# Patient Record
Sex: MALE | Race: WHITE | Employment: OTHER | URBAN - NONMETROPOLITAN AREA
[De-identification: names, ages, dates, MRNs, and addresses within clinical notes are randomized per-mention and may not be internally consistent; named-entity substitution may affect disease eponyms.]

---

## 2023-03-07 LAB
ESTIMATED AVERAGE GLUCOSE FOR HBA1C: 169 MG/DL
HEMOGLOBIN A1C/HEMOGLOBIN TOTAL IN BLOOD: 7.5 %

## 2023-03-10 DIAGNOSIS — E87.6 HYPOKALEMIA: ICD-10-CM

## 2023-03-10 RX ORDER — POTASSIUM CHLORIDE 750 MG/1
1 TABLET, EXTENDED RELEASE ORAL DAILY
COMMUNITY
Start: 2021-05-24 | End: 2023-03-10 | Stop reason: SDUPTHER

## 2023-03-10 RX ORDER — POTASSIUM CHLORIDE 750 MG/1
10 TABLET, FILM COATED, EXTENDED RELEASE ORAL DAILY
Qty: 90 TABLET | Refills: 0 | Status: SHIPPED | OUTPATIENT
Start: 2023-03-10 | End: 2023-06-01

## 2023-04-26 ENCOUNTER — TELEMEDICINE (OUTPATIENT)
Dept: PRIMARY CARE | Facility: CLINIC | Age: 88
End: 2023-04-26
Payer: MEDICARE

## 2023-04-26 DIAGNOSIS — J06.9 VIRAL UPPER RESPIRATORY INFECTION: Primary | ICD-10-CM

## 2023-04-26 PROBLEM — E11.9 TYPE 2 DIABETES MELLITUS (MULTI): Status: ACTIVE | Noted: 2023-04-26

## 2023-04-26 PROBLEM — I10 BENIGN ESSENTIAL HYPERTENSION: Status: ACTIVE | Noted: 2023-04-26

## 2023-04-26 PROBLEM — K21.9 ESOPHAGEAL REFLUX: Status: ACTIVE | Noted: 2023-04-26

## 2023-04-26 PROBLEM — E55.9 VITAMIN D DEFICIENCY: Status: ACTIVE | Noted: 2023-04-26

## 2023-04-26 PROBLEM — E87.6 HYPOKALEMIA: Status: ACTIVE | Noted: 2023-04-26

## 2023-04-26 PROBLEM — F32.5 DEPRESSION, MAJOR, IN REMISSION (CMS-HCC): Status: ACTIVE | Noted: 2023-04-26

## 2023-04-26 PROBLEM — E78.5 HYPERLIPIDEMIA: Status: ACTIVE | Noted: 2023-04-26

## 2023-04-26 PROBLEM — I73.9 PVD (PERIPHERAL VASCULAR DISEASE) (CMS-HCC): Status: ACTIVE | Noted: 2023-04-26

## 2023-04-26 PROCEDURE — 99442 PR PHYS/QHP TELEPHONE EVALUATION 11-20 MIN: CPT | Performed by: NURSE PRACTITIONER

## 2023-04-26 RX ORDER — GLYBURIDE-METFORMIN HYDROCHLORIDE 5; 500 MG/1; MG/1
2 TABLET ORAL
COMMUNITY
Start: 2015-02-10 | End: 2023-07-17 | Stop reason: SDUPTHER

## 2023-04-26 RX ORDER — TAMSULOSIN HYDROCHLORIDE 0.4 MG/1
1 CAPSULE ORAL DAILY
COMMUNITY
Start: 2015-02-18

## 2023-04-26 RX ORDER — BLOOD-GLUCOSE METER
1 EACH MISCELLANEOUS 2 TIMES DAILY
COMMUNITY
Start: 2018-03-12 | End: 2023-05-12 | Stop reason: WASHOUT

## 2023-04-26 RX ORDER — ATORVASTATIN CALCIUM 40 MG/1
40 TABLET, FILM COATED ORAL DAILY
COMMUNITY
Start: 2015-02-10 | End: 2023-06-02 | Stop reason: SDUPTHER

## 2023-04-26 RX ORDER — ATENOLOL AND CHLORTHALIDONE TABLET 50; 25 MG/1; MG/1
1 TABLET ORAL DAILY
COMMUNITY
Start: 2015-02-10 | End: 2023-06-02 | Stop reason: SDUPTHER

## 2023-04-26 RX ORDER — ALOGLIPTIN 25 MG/1
1 TABLET, FILM COATED ORAL DAILY
COMMUNITY
End: 2023-06-20

## 2023-04-26 RX ORDER — SPIRONOLACTONE 25 MG
1 TABLET ORAL DAILY
COMMUNITY

## 2023-04-26 RX ORDER — LINAGLIPTIN 5 MG/1
1 TABLET, FILM COATED ORAL DAILY
COMMUNITY
Start: 2022-08-23 | End: 2023-07-17 | Stop reason: SDUPTHER

## 2023-04-26 RX ORDER — LEUPROLIDE ACETATE 45 MG
45 KIT INTRAMUSCULAR
COMMUNITY
Start: 2019-03-11

## 2023-04-26 RX ORDER — ACETAMINOPHEN 500 MG
1 TABLET ORAL DAILY
COMMUNITY
Start: 2018-06-11

## 2023-04-26 RX ORDER — ASPIRIN 81 MG/1
1 TABLET ORAL DAILY
COMMUNITY
Start: 2015-11-19

## 2023-04-26 ASSESSMENT — ENCOUNTER SYMPTOMS
HEMATOLOGIC/LYMPHATIC NEGATIVE: 1
COUGH: 1
WHEEZING: 0
DIARRHEA: 0
EYES NEGATIVE: 1
ACTIVITY CHANGE: 0
BACK PAIN: 1
NAUSEA: 0
SORE THROAT: 0
ENDOCRINE NEGATIVE: 1
SHORTNESS OF BREATH: 0
CONSTIPATION: 0
CHILLS: 0
UNEXPECTED WEIGHT CHANGE: 0
RHINORRHEA: 1
PSYCHIATRIC NEGATIVE: 1
NUMBNESS: 0
FATIGUE: 0
ABDOMINAL PAIN: 0
PALPITATIONS: 0
DIZZINESS: 0
VOMITING: 0
HEADACHES: 0
ALLERGIC/IMMUNOLOGIC NEGATIVE: 1
ARTHRALGIAS: 1
GASTROINTESTINAL NEGATIVE: 1
SPEECH DIFFICULTY: 0

## 2023-04-26 NOTE — PROGRESS NOTES
Subjective   Patient ID: Dmitriy Delgado is a 89 y.o. male who presents for Cough and Nasal Congestion.    Virtual or Telephone Consent    A telephone visit (audio only) between the patient (at the originating site) and the provider (at the distant site) was utilized to provide this telehealth service.   Verbal consent was requested and obtained from Dmitriy Delgado on this date, 04/26/23 for a telehealth visit.      Cough  This is a new problem. The current episode started in the past 7 days. The cough is Non-productive. Associated symptoms include rhinorrhea. Pertinent negatives include no chest pain, chills, ear pain, headaches, sore throat, shortness of breath or wheezing. Nothing aggravates the symptoms. He has tried nothing for the symptoms.   Recommend start over-the-counter histamine such as Claritin or Zyrtec. Over-the-counter cough suppressant such as Robitussin or Mucinex.  Instructed to call the office if symptoms worsen or do not improve.    Review of Systems   Constitutional:  Negative for activity change, chills, fatigue and unexpected weight change.   HENT:  Positive for rhinorrhea. Negative for congestion, ear pain and sore throat.    Eyes: Negative.    Respiratory:  Positive for cough. Negative for shortness of breath and wheezing.    Cardiovascular:  Negative for chest pain, palpitations and leg swelling.   Gastrointestinal: Negative.  Negative for abdominal pain, constipation, diarrhea, nausea and vomiting.   Endocrine: Negative.    Genitourinary: Negative.    Musculoskeletal:  Positive for arthralgias and back pain.   Skin: Negative.    Allergic/Immunologic: Negative.    Neurological:  Negative for dizziness, speech difficulty, numbness and headaches.   Hematological: Negative.    Psychiatric/Behavioral: Negative.     All other systems reviewed and are negative.      Objective   There were no vitals taken for this visit.    Physical Exam Unable, telephone visit    Assessment/Plan   Problem  List Items Addressed This Visit    None  Visit Diagnoses       Viral upper respiratory infection    -  Primary  -Over-the-counter Mucinex or Robitussin according to package directions as needed for cough  -OTC Claritin or Zyrtec  -Tylenol 650-1000 mg every 8 hours as needed for pain  -Drink plenty of fluids  -Get plenty of rest  -Call the office if symptoms worsen or do not improve

## 2023-05-01 ENCOUNTER — PATIENT OUTREACH (OUTPATIENT)
Dept: PRIMARY CARE | Facility: CLINIC | Age: 88
End: 2023-05-01
Payer: MEDICARE

## 2023-05-01 DIAGNOSIS — J18.9 COMMUNITY ACQUIRED PNEUMONIA, UNSPECIFIED LATERALITY: ICD-10-CM

## 2023-05-01 RX ORDER — CEFUROXIME AXETIL 500 MG/1
500 TABLET ORAL 2 TIMES DAILY
COMMUNITY
End: 2023-06-20 | Stop reason: ALTCHOICE

## 2023-05-01 RX ORDER — DEXAMETHASONE 2 MG/1
TABLET ORAL
COMMUNITY
End: 2023-06-20 | Stop reason: ALTCHOICE

## 2023-05-01 NOTE — PROGRESS NOTES
Engagement  Call Start Time: 1128 (5/1/2023 11:35 AM)    Medications  Medications reviewed with patient/caregiver?: Yes (5/1/2023 11:35 AM)  Is the patient having any side effects they believe may be caused by any medication additions or changes?: No (5/1/2023 11:35 AM)  Does the patient have all medications ordered at discharge?: Yes (5/1/2023 11:35 AM)  Prescription Comments: see med list (5/1/2023 11:35 AM)  Is the patient taking all medications as directed (includes completed medication regime)?: Yes (5/1/2023 11:35 AM)  Medication Comments: see med list (5/1/2023 11:35 AM)    Appointments  Does the patient have a primary care provider?: Yes (5/1/2023 11:35 AM)  Care Management Interventions: Verified appointment date/time/provider; Advised patient to make appointment; Educated patient on importance of making appointment (5/1/2023 11:35 AM)  Has the patient kept scheduled appointments due by today?: Yes (5/1/2023 11:35 AM)    Patient Teaching  Does the patient have access to their discharge instructions?: Yes (5/1/2023 11:35 AM)  Care Management Interventions: Reviewed instructions with patient (5/1/2023 11:35 AM)  What is the patient's perception of their health status since discharge?: Same (Patient reported feeling fatigue and weak.) (5/1/2023 11:35 AM)      Discharge Facility:[facility]  Discharge Diagnosis:Community Acquired Pneumonia   Admission Date:4/27/23  Discharge Date: 4/29/23    PCP Appointment Date:5/12/23  Specialist Appointment Date:   Hospital Encounter and Summary: Linked   See discharge assessment below for further details

## 2023-05-11 PROBLEM — C61 MALIGNANT TUMOR OF PROSTATE (MULTI): Status: ACTIVE | Noted: 2023-05-11

## 2023-05-11 PROBLEM — E29.1 TESTICULAR HYPOFUNCTION: Status: ACTIVE | Noted: 2023-05-11

## 2023-05-12 ENCOUNTER — OFFICE VISIT (OUTPATIENT)
Dept: PRIMARY CARE | Facility: CLINIC | Age: 88
End: 2023-05-12
Payer: MEDICARE

## 2023-05-12 VITALS
TEMPERATURE: 96.7 F | SYSTOLIC BLOOD PRESSURE: 100 MMHG | DIASTOLIC BLOOD PRESSURE: 62 MMHG | WEIGHT: 214 LBS | OXYGEN SATURATION: 99 % | HEART RATE: 61 BPM | BODY MASS INDEX: 35.61 KG/M2

## 2023-05-12 DIAGNOSIS — J18.9 COMMUNITY ACQUIRED PNEUMONIA, UNSPECIFIED LATERALITY: ICD-10-CM

## 2023-05-12 DIAGNOSIS — E66.01 CLASS 2 SEVERE OBESITY WITH SERIOUS COMORBIDITY AND BODY MASS INDEX (BMI) OF 35.0 TO 35.9 IN ADULT, UNSPECIFIED OBESITY TYPE (MULTI): ICD-10-CM

## 2023-05-12 DIAGNOSIS — I10 BENIGN ESSENTIAL HYPERTENSION: ICD-10-CM

## 2023-05-12 DIAGNOSIS — L01.00 IMPETIGO: ICD-10-CM

## 2023-05-12 DIAGNOSIS — E11.65 TYPE 2 DIABETES MELLITUS WITH HYPERGLYCEMIA, WITHOUT LONG-TERM CURRENT USE OF INSULIN (MULTI): ICD-10-CM

## 2023-05-12 DIAGNOSIS — E11.65 TYPE 2 DIABETES MELLITUS WITH HYPERGLYCEMIA, WITHOUT LONG-TERM CURRENT USE OF INSULIN (MULTI): Primary | ICD-10-CM

## 2023-05-12 PROBLEM — E66.812 CLASS 2 SEVERE OBESITY WITH SERIOUS COMORBIDITY AND BODY MASS INDEX (BMI) OF 35.0 TO 35.9 IN ADULT: Status: ACTIVE | Noted: 2023-05-12

## 2023-05-12 PROCEDURE — 3078F DIAST BP <80 MM HG: CPT | Performed by: NURSE PRACTITIONER

## 2023-05-12 PROCEDURE — 3074F SYST BP LT 130 MM HG: CPT | Performed by: NURSE PRACTITIONER

## 2023-05-12 PROCEDURE — 1036F TOBACCO NON-USER: CPT | Performed by: NURSE PRACTITIONER

## 2023-05-12 PROCEDURE — 1160F RVW MEDS BY RX/DR IN RCRD: CPT | Performed by: NURSE PRACTITIONER

## 2023-05-12 PROCEDURE — 99495 TRANSJ CARE MGMT MOD F2F 14D: CPT | Performed by: NURSE PRACTITIONER

## 2023-05-12 PROCEDURE — 1159F MED LIST DOCD IN RCRD: CPT | Performed by: NURSE PRACTITIONER

## 2023-05-12 RX ORDER — CALCIUM CITRATE/VITAMIN D3 200MG-6.25
TABLET ORAL
Qty: 100 STRIP | Refills: 0 | Status: SHIPPED | OUTPATIENT
Start: 2023-05-12 | End: 2024-03-07 | Stop reason: WASHOUT

## 2023-05-12 RX ORDER — LANCETS 33 GAUGE
EACH MISCELLANEOUS
Qty: 100 EACH | Refills: 0 | Status: SHIPPED | OUTPATIENT
Start: 2023-05-12 | End: 2024-03-07 | Stop reason: WASHOUT

## 2023-05-12 RX ORDER — MUPIROCIN 20 MG/G
OINTMENT TOPICAL 3 TIMES DAILY
Qty: 22 G | Refills: 0 | Status: SHIPPED | OUTPATIENT
Start: 2023-05-12 | End: 2023-05-22

## 2023-05-12 RX ORDER — INSULIN PUMP SYRINGE, 3 ML
1 EACH MISCELLANEOUS
Qty: 1 EACH | Refills: 0 | Status: SHIPPED | OUTPATIENT
Start: 2023-05-12 | End: 2023-05-16

## 2023-05-12 ASSESSMENT — ENCOUNTER SYMPTOMS
WHEEZING: 0
CONSTIPATION: 0
PALPITATIONS: 0
DIARRHEA: 0
VOMITING: 0
UNEXPECTED WEIGHT CHANGE: 0
SHORTNESS OF BREATH: 0
SPEECH DIFFICULTY: 0
ENDOCRINE NEGATIVE: 1
HEMATOLOGIC/LYMPHATIC NEGATIVE: 1
CHILLS: 0
ALLERGIC/IMMUNOLOGIC NEGATIVE: 1
GASTROINTESTINAL NEGATIVE: 1
HEADACHES: 0
EYES NEGATIVE: 1
DIZZINESS: 0
BACK PAIN: 1
ACTIVITY CHANGE: 0
COUGH: 1
NUMBNESS: 0
SORE THROAT: 0
ABDOMINAL PAIN: 0
NAUSEA: 0
PSYCHIATRIC NEGATIVE: 1
ARTHRALGIAS: 1
RHINORRHEA: 1
FATIGUE: 0

## 2023-05-12 NOTE — ASSESSMENT & PLAN NOTE
-Avoid sweets and sugary drinks  -Drink plenty of water  -Avoid processed foods and refined foods  -Eat fruits, vegetables, lean protein, whole grains  -Increase your activity level

## 2023-05-12 NOTE — PROGRESS NOTES
"Patient: Dmitriy Delgado  : 1933  PCP: Amanda Garcia, APRN-CNP, [facility]  MRN: [id number]  Program: No linked episodes       Dmitriy Delgado is a 89 y.o. male presenting today for follow-up after being discharged from the hospital 13 days ago. The main problem requiring admission was CAP. The discharge summary and/or Transitional Care Management documentation was reviewed. Medication reconciliation was performed as indicated via the \"Malik as Reviewed\" timestamp.     Dmitriy Delgado was contacted by Transitional Care Management services two days after his discharge. This encounter and supporting documentation was reviewed.    The complexity of medical decision making for this patient's transitional care is moderate.    CAP, finished with antibiotics and steroids. He is feeling down after being sick. No cough, no dyspnea. Still feeling tired. Eating well.  Patient has crusted rash under nostrils from nasal cannula during hospitalization. Has been improving. Start mupirocin 3 times a day for 10 days.    Physical Exam  Vitals and nursing note reviewed.   Constitutional:       General: He is not in acute distress.     Appearance: Normal appearance. He is obese. He is not ill-appearing.   HENT:      Head: Normocephalic and atraumatic.      Right Ear: Tympanic membrane, ear canal and external ear normal.      Left Ear: Tympanic membrane, ear canal and external ear normal.      Nose: Nose normal.      Mouth/Throat:      Mouth: Mucous membranes are moist.      Pharynx: Oropharynx is clear.   Eyes:      Extraocular Movements: Extraocular movements intact.      Conjunctiva/sclera: Conjunctivae normal.      Pupils: Pupils are equal, round, and reactive to light.   Cardiovascular:      Rate and Rhythm: Normal rate and regular rhythm.      Pulses: Normal pulses.      Heart sounds: Normal heart sounds. No murmur heard.  Pulmonary:      Effort: Pulmonary effort is normal. No respiratory distress.      Breath sounds: " Normal breath sounds. No wheezing.   Abdominal:      General: Bowel sounds are normal. There is no distension.      Palpations: Abdomen is soft.      Tenderness: There is no abdominal tenderness.   Musculoskeletal:         General: No tenderness. Normal range of motion.      Cervical back: Normal range of motion and neck supple.      Right lower leg: No edema.      Left lower leg: No edema.      Comments: Using cane   Skin:     General: Skin is warm and dry.      Capillary Refill: Capillary refill takes less than 2 seconds.   Neurological:      General: No focal deficit present.      Mental Status: He is alert and oriented to person, place, and time.   Psychiatric:         Mood and Affect: Mood normal.         Behavior: Behavior normal.         Thought Content: Thought content normal.         Judgment: Judgment normal.         Assessment/Plan   Problem List Items Addressed This Visit          Circulatory    Benign essential hypertension     Controlled. Continue current medications.  -Low fat/cholesterol, low sodium, low carbohydrate diet  -Exercise as tolerated 150 minutes/week, increase activity slowly  -Weight loss            Endocrine/Metabolic    Type 2 diabetes mellitus (CMS/HCC) - Primary     -Low flat/cholesterol, low sodium, carbohydrate controlled diet  -Exercise as tolerated 150 minutes/week, gradually increase activity  -Weight loss  -Regular follow-up with ophthalmology and podiatry         Relevant Medications    FreeStyle glucose monitoring (True Metrix Air Glucose Meter) kit    blood sugar diagnostic (True Metrix Glucose Test Strip) strip    lancets 33 gauge misc    Other Relevant Orders    Follow Up In Primary Care    Class 2 severe obesity with serious comorbidity and body mass index (BMI) of 35.0 to 35.9 in adult (CMS/HCC)     -Avoid sweets and sugary drinks  -Drink plenty of water  -Avoid processed foods and refined foods  -Eat fruits, vegetables, lean protein, whole grains  -Increase your  activity level            Other Visit Diagnoses       Impetigo        Relevant Medications    mupirocin (Bactroban) 2 % ointment    Community acquired pneumonia, unspecified laterality      Call the office if symptoms worsen or do not improve  Stay well hydrated            Review of Systems   Constitutional:  Negative for activity change, chills, fatigue and unexpected weight change.   HENT:  Positive for hearing loss and rhinorrhea. Negative for congestion, ear pain and sore throat.    Eyes: Negative.    Respiratory:  Positive for cough. Negative for shortness of breath and wheezing.    Cardiovascular:  Negative for chest pain, palpitations and leg swelling.   Gastrointestinal: Negative.  Negative for abdominal pain, constipation, diarrhea, nausea and vomiting.   Endocrine: Negative.    Genitourinary: Negative.    Musculoskeletal:  Positive for arthralgias, back pain and gait problem.   Skin: Negative.    Allergic/Immunologic: Negative.    Neurological:  Negative for dizziness, speech difficulty, numbness and headaches.   Hematological: Negative.    Psychiatric/Behavioral: Negative.     All other systems reviewed and are negative.      No family history on file.    Engagement  Call Start Time: 1128 (5/1/2023 11:35 AM)    Medications  Medications reviewed with patient/caregiver?: Yes (5/1/2023 11:35 AM)  Is the patient having any side effects they believe may be caused by any medication additions or changes?: No (5/1/2023 11:35 AM)  Does the patient have all medications ordered at discharge?: Yes (5/1/2023 11:35 AM)  Prescription Comments: see med list (5/1/2023 11:35 AM)  Is the patient taking all medications as directed (includes completed medication regime)?: Yes (5/1/2023 11:35 AM)  Medication Comments: see med list (5/1/2023 11:35 AM)    Appointments  Does the patient have a primary care provider?: Yes (5/1/2023 11:35 AM)  Care Management Interventions: Verified appointment date/time/provider; Advised patient  to make appointment; Educated patient on importance of making appointment (5/1/2023 11:35 AM)  Has the patient kept scheduled appointments due by today?: Yes (5/1/2023 11:35 AM)    Patient Teaching  Does the patient have access to their discharge instructions?: Yes (5/1/2023 11:35 AM)  Care Management Interventions: Reviewed instructions with patient (5/1/2023 11:35 AM)  What is the patient's perception of their health status since discharge?: Same (Patient reported feeling fatigue and weak.) (5/1/2023 11:35 AM)        No follow-ups on file.

## 2023-05-12 NOTE — ASSESSMENT & PLAN NOTE
-Low flat/cholesterol, low sodium, carbohydrate controlled diet  -Exercise as tolerated 150 minutes/week, gradually increase activity  -Weight loss  -Regular follow-up with ophthalmology and podiatry

## 2023-05-16 RX ORDER — INSULIN PUMP SYRINGE, 3 ML
EACH MISCELLANEOUS
Qty: 1 EACH | Refills: 0 | Status: SHIPPED | OUTPATIENT
Start: 2023-05-16 | End: 2024-03-07 | Stop reason: WASHOUT

## 2023-05-17 DIAGNOSIS — E11.9 TYPE 2 DIABETES MELLITUS WITHOUT COMPLICATION, UNSPECIFIED WHETHER LONG TERM INSULIN USE (MULTI): ICD-10-CM

## 2023-05-18 RX ORDER — BLOOD-GLUCOSE METER
EACH MISCELLANEOUS
Qty: 90 STRIP | Refills: 0 | Status: SHIPPED | OUTPATIENT
Start: 2023-05-18 | End: 2024-03-07 | Stop reason: WASHOUT

## 2023-05-23 ENCOUNTER — APPOINTMENT (OUTPATIENT)
Dept: PRIMARY CARE | Facility: CLINIC | Age: 88
End: 2023-05-23
Payer: MEDICARE

## 2023-06-01 DIAGNOSIS — E87.6 HYPOKALEMIA: ICD-10-CM

## 2023-06-01 RX ORDER — POTASSIUM CHLORIDE 750 MG/1
TABLET, EXTENDED RELEASE ORAL
Qty: 90 TABLET | Refills: 0 | Status: SHIPPED | OUTPATIENT
Start: 2023-06-01 | End: 2023-08-23

## 2023-06-02 DIAGNOSIS — E78.5 HYPERLIPIDEMIA, UNSPECIFIED HYPERLIPIDEMIA TYPE: ICD-10-CM

## 2023-06-02 DIAGNOSIS — I10 BENIGN ESSENTIAL HYPERTENSION: ICD-10-CM

## 2023-06-02 RX ORDER — ATENOLOL AND CHLORTHALIDONE TABLET 50; 25 MG/1; MG/1
1 TABLET ORAL DAILY
Qty: 90 TABLET | Refills: 0 | Status: SHIPPED | OUTPATIENT
Start: 2023-06-02 | End: 2023-10-03 | Stop reason: SDUPTHER

## 2023-06-02 RX ORDER — ATORVASTATIN CALCIUM 40 MG/1
40 TABLET, FILM COATED ORAL DAILY
Qty: 90 TABLET | Refills: 0 | Status: SHIPPED | OUTPATIENT
Start: 2023-06-02 | End: 2023-10-03 | Stop reason: SDUPTHER

## 2023-06-13 ENCOUNTER — APPOINTMENT (OUTPATIENT)
Dept: PRIMARY CARE | Facility: CLINIC | Age: 88
End: 2023-06-13
Payer: MEDICARE

## 2023-06-20 ENCOUNTER — OFFICE VISIT (OUTPATIENT)
Dept: PRIMARY CARE | Facility: CLINIC | Age: 88
End: 2023-06-20
Payer: MEDICARE

## 2023-06-20 VITALS
DIASTOLIC BLOOD PRESSURE: 62 MMHG | WEIGHT: 216.7 LBS | SYSTOLIC BLOOD PRESSURE: 138 MMHG | BODY MASS INDEX: 36.06 KG/M2 | HEART RATE: 50 BPM | TEMPERATURE: 96.7 F | OXYGEN SATURATION: 96 %

## 2023-06-20 DIAGNOSIS — E11.65 TYPE 2 DIABETES MELLITUS WITH HYPERGLYCEMIA, WITHOUT LONG-TERM CURRENT USE OF INSULIN (MULTI): Primary | ICD-10-CM

## 2023-06-20 DIAGNOSIS — F32.5 DEPRESSION, MAJOR, IN REMISSION (CMS-HCC): ICD-10-CM

## 2023-06-20 DIAGNOSIS — I10 BENIGN ESSENTIAL HYPERTENSION: ICD-10-CM

## 2023-06-20 DIAGNOSIS — E55.9 VITAMIN D DEFICIENCY: ICD-10-CM

## 2023-06-20 DIAGNOSIS — E66.01 CLASS 2 SEVERE OBESITY WITH SERIOUS COMORBIDITY AND BODY MASS INDEX (BMI) OF 36.0 TO 36.9 IN ADULT, UNSPECIFIED OBESITY TYPE (MULTI): ICD-10-CM

## 2023-06-20 DIAGNOSIS — E78.2 MIXED HYPERLIPIDEMIA: ICD-10-CM

## 2023-06-20 DIAGNOSIS — C61 MALIGNANT TUMOR OF PROSTATE (MULTI): ICD-10-CM

## 2023-06-20 DIAGNOSIS — I73.9 PVD (PERIPHERAL VASCULAR DISEASE) (CMS-HCC): ICD-10-CM

## 2023-06-20 PROBLEM — E66.812 CLASS 2 SEVERE OBESITY WITH SERIOUS COMORBIDITY AND BODY MASS INDEX (BMI) OF 35.0 TO 35.9 IN ADULT: Status: RESOLVED | Noted: 2023-05-12 | Resolved: 2023-06-20

## 2023-06-20 PROCEDURE — 99214 OFFICE O/P EST MOD 30 MIN: CPT | Performed by: NURSE PRACTITIONER

## 2023-06-20 PROCEDURE — 3075F SYST BP GE 130 - 139MM HG: CPT | Performed by: NURSE PRACTITIONER

## 2023-06-20 PROCEDURE — 3078F DIAST BP <80 MM HG: CPT | Performed by: NURSE PRACTITIONER

## 2023-06-20 PROCEDURE — 1160F RVW MEDS BY RX/DR IN RCRD: CPT | Performed by: NURSE PRACTITIONER

## 2023-06-20 PROCEDURE — 1159F MED LIST DOCD IN RCRD: CPT | Performed by: NURSE PRACTITIONER

## 2023-06-20 PROCEDURE — 1036F TOBACCO NON-USER: CPT | Performed by: NURSE PRACTITIONER

## 2023-06-20 ASSESSMENT — ENCOUNTER SYMPTOMS
ABDOMINAL PAIN: 0
HEMATOLOGIC/LYMPHATIC NEGATIVE: 1
NAUSEA: 0
SPEECH DIFFICULTY: 0
CONSTIPATION: 0
WHEEZING: 0
BACK PAIN: 1
PALPITATIONS: 0
DIZZINESS: 0
ARTHRALGIAS: 1
ENDOCRINE NEGATIVE: 1
RHINORRHEA: 0
SHORTNESS OF BREATH: 0
VOMITING: 0
ACTIVITY CHANGE: 0
NUMBNESS: 0
UNEXPECTED WEIGHT CHANGE: 0
ALLERGIC/IMMUNOLOGIC NEGATIVE: 1
DIARRHEA: 0
FATIGUE: 0
COUGH: 0
EYES NEGATIVE: 1
PSYCHIATRIC NEGATIVE: 1
GASTROINTESTINAL NEGATIVE: 1
SORE THROAT: 0
HEADACHES: 0
CHILLS: 0

## 2023-06-20 NOTE — PROGRESS NOTES
Subjective   Patient ID: Dmitriy Delgado is a 89 y.o. male who presents for Hypertension, Hyperlipidemia, and Diabetes.    Hospitalized in May for CAP. Impetigo cleared up. Denies cough, dyspnea.  Complains of multiple joint aches and pains, worse in his knees. Rarely takes Tylenol. Recommend Tylenol 650 mg 2 tabs every 12 hours as needed for pain. He is not interested in physical therapy. Seeing ortho for injections. Helps for a while.  GERD controlled with omeprazole 20 mg daily.  A1c 7.5%, taking glyburide-metformin  mg and Tradjenta 5 mg daily Wants to work on diet and weight loss. Discussed lifestyle changes including portion control, food choices, and increased activity.  PVD, neuropathy. Complains of some tingling in his feet. Denies cramping. Needs to return to podiatry for regular diabetic foot care.  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   HENT:  Positive for hearing loss. Negative for congestion, ear pain, rhinorrhea and sore throat.    Eyes: Negative.    Respiratory:  Negative for cough, shortness of breath and wheezing.    Cardiovascular:  Negative for chest pain, palpitations and leg swelling.   Gastrointestinal: Negative.  Negative for abdominal pain, constipation, diarrhea, nausea and vomiting.   Endocrine: Negative.    Genitourinary: Negative.    Musculoskeletal:  Positive for arthralgias, back pain and gait problem.   Skin: Negative.    Allergic/Immunologic: Negative.    Neurological:  Negative for dizziness, speech difficulty, numbness and headaches.   Hematological: Negative.    Psychiatric/Behavioral: Negative.     All other systems reviewed and are negative.      Objective   /62   Pulse 50   Temp 35.9 °C (96.7 °F)   Wt 98.3 kg (216 lb 11.2 oz)   SpO2 96%   BMI  36.06 kg/m²     Physical Exam  Vitals and nursing note reviewed.   Constitutional:       General: He is not in acute distress.     Appearance: Normal appearance. He is obese. He is not ill-appearing.   HENT:      Head: Normocephalic and atraumatic.      Right Ear: Tympanic membrane, ear canal and external ear normal.      Left Ear: Tympanic membrane, ear canal and external ear normal.      Nose: Nose normal.      Mouth/Throat:      Mouth: Mucous membranes are moist.      Pharynx: Oropharynx is clear.   Eyes:      Extraocular Movements: Extraocular movements intact.      Conjunctiva/sclera: Conjunctivae normal.      Pupils: Pupils are equal, round, and reactive to light.   Cardiovascular:      Rate and Rhythm: Normal rate and regular rhythm.      Pulses: Normal pulses.      Heart sounds: Normal heart sounds. No murmur heard.  Pulmonary:      Effort: Pulmonary effort is normal. No respiratory distress.      Breath sounds: Normal breath sounds. No wheezing.   Abdominal:      General: Bowel sounds are normal. There is no distension.      Palpations: Abdomen is soft.      Tenderness: There is no abdominal tenderness.   Musculoskeletal:         General: No tenderness. Normal range of motion.      Cervical back: Normal range of motion and neck supple.      Right lower leg: No edema.      Left lower leg: No edema.      Comments: Using cane   Skin:     General: Skin is warm and dry.      Capillary Refill: Capillary refill takes less than 2 seconds.   Neurological:      General: No focal deficit present.      Mental Status: He is alert and oriented to person, place, and time.   Psychiatric:         Mood and Affect: Mood normal.         Behavior: Behavior normal.         Thought Content: Thought content normal.         Judgment: Judgment normal.       Assessment/Plan     # Hypertension, controlled  -continue atenolol-chlorthalidone  -Low fat/cholesterol, low sodium, low carbohydrate diet  -Exercise as tolerated 150  minutes/week, increase activity slowly  -Weight loss   # Diabetes, A1c 7.5%  -glyburide/metformin 10 mg/1000 mg 2 times daily, be sure to take twice a day  -continue Tradjenta 5 mg daily  -better glycemic control, decrease carbohydrate intake, increase activity level gradually  -Low fat/cholesterol, low sodium, carbohydrate controlled diet  -Exercise as tolerated 150 minutes/week, increase activity slowly  -Weight loss  # Hyperlipidemia  -continue atorvastatin 40 mg daily  -low fat/low cholesterol diet  -eat more fresh foods  -avoid processed/pre-packaged/fast foods  -increase physical activity  -monitor lipid panel  -weight loss  # Joint pain  -Tylenol 650 mg every 6 hours as needed for pain or ibuprofen 200 mg take one tab 2 times a day as needed for pain, gentle stretching exercises, referral to PT  -follow with ortho  # Diabetic foot care and onychomycosis  -follow with podiatry  # Vitamin D deficiency  -continue vitamin D 125 mcg daily  -monitor vitamin D  # Hx prostate cancer  -follow with urology  # PVD  -monitor  # Squamous cell carcinoma  -following with surgery  # Obesity, BMI 36  -Avoid sweets and sugary drinks  -Drink plenty of water  -Avoid processed foods and refined foods  -Eat fruits, vegetables, lean protein, whole grains  -Increase your activity level      Follow-up in 3 months and as needed

## 2023-07-17 DIAGNOSIS — E11.9 TYPE 2 DIABETES MELLITUS WITHOUT COMPLICATION, UNSPECIFIED WHETHER LONG TERM INSULIN USE (MULTI): ICD-10-CM

## 2023-07-17 RX ORDER — LINAGLIPTIN 5 MG/1
5 TABLET, FILM COATED ORAL DAILY
Qty: 90 TABLET | Refills: 0 | Status: SHIPPED | OUTPATIENT
Start: 2023-07-17 | End: 2024-03-25

## 2023-07-17 RX ORDER — GLYBURIDE-METFORMIN HYDROCHLORIDE 5; 500 MG/1; MG/1
2 TABLET ORAL
Qty: 360 TABLET | Refills: 0 | Status: SHIPPED | OUTPATIENT
Start: 2023-07-17 | End: 2024-03-25

## 2023-08-23 DIAGNOSIS — E87.6 HYPOKALEMIA: ICD-10-CM

## 2023-08-23 RX ORDER — POTASSIUM CHLORIDE 750 MG/1
TABLET, EXTENDED RELEASE ORAL
Qty: 90 TABLET | Refills: 0 | Status: SHIPPED | OUTPATIENT
Start: 2023-08-23 | End: 2023-11-28

## 2023-09-13 ENCOUNTER — LAB (OUTPATIENT)
Dept: LAB | Facility: LAB | Age: 88
End: 2023-09-13
Payer: MEDICARE

## 2023-09-13 DIAGNOSIS — E11.65 TYPE 2 DIABETES MELLITUS WITH HYPERGLYCEMIA, WITHOUT LONG-TERM CURRENT USE OF INSULIN (MULTI): ICD-10-CM

## 2023-09-13 DIAGNOSIS — E78.2 MIXED HYPERLIPIDEMIA: ICD-10-CM

## 2023-09-13 DIAGNOSIS — E55.9 VITAMIN D DEFICIENCY: ICD-10-CM

## 2023-09-13 LAB
ALANINE AMINOTRANSFERASE (SGPT) (U/L) IN SER/PLAS: 19 U/L (ref 10–52)
ALBUMIN (G/DL) IN SER/PLAS: 4 G/DL (ref 3.4–5)
ALKALINE PHOSPHATASE (U/L) IN SER/PLAS: 64 U/L (ref 33–136)
ANION GAP IN SER/PLAS: 15 MMOL/L (ref 10–20)
ASPARTATE AMINOTRANSFERASE (SGOT) (U/L) IN SER/PLAS: 20 U/L (ref 9–39)
BASOPHILS (10*3/UL) IN BLOOD BY AUTOMATED COUNT: 0.04 X10E9/L (ref 0–0.1)
BASOPHILS/100 LEUKOCYTES IN BLOOD BY AUTOMATED COUNT: 0.5 % (ref 0–2)
BILIRUBIN TOTAL (MG/DL) IN SER/PLAS: 0.8 MG/DL (ref 0–1.2)
CALCIUM (MG/DL) IN SER/PLAS: 9.8 MG/DL (ref 8.6–10.3)
CARBON DIOXIDE, TOTAL (MMOL/L) IN SER/PLAS: 29 MMOL/L (ref 21–32)
CHLORIDE (MMOL/L) IN SER/PLAS: 97 MMOL/L (ref 98–107)
CHOLESTEROL (MG/DL) IN SER/PLAS: 137 MG/DL (ref 0–199)
CHOLESTEROL IN HDL (MG/DL) IN SER/PLAS: 33.7 MG/DL
CHOLESTEROL/HDL RATIO: 4.1
CREATININE (MG/DL) IN SER/PLAS: 1.14 MG/DL (ref 0.5–1.3)
EOSINOPHILS (10*3/UL) IN BLOOD BY AUTOMATED COUNT: 0.27 X10E9/L (ref 0–0.4)
EOSINOPHILS/100 LEUKOCYTES IN BLOOD BY AUTOMATED COUNT: 3.2 % (ref 0–6)
ERYTHROCYTE DISTRIBUTION WIDTH (RATIO) BY AUTOMATED COUNT: 12.7 % (ref 11.5–14.5)
ERYTHROCYTE MEAN CORPUSCULAR HEMOGLOBIN CONCENTRATION (G/DL) BY AUTOMATED: 32.5 G/DL (ref 32–36)
ERYTHROCYTE MEAN CORPUSCULAR VOLUME (FL) BY AUTOMATED COUNT: 97 FL (ref 80–100)
ERYTHROCYTES (10*6/UL) IN BLOOD BY AUTOMATED COUNT: 3.67 X10E12/L (ref 4.5–5.9)
GFR MALE: 61 ML/MIN/1.73M2
GLUCOSE (MG/DL) IN SER/PLAS: 233 MG/DL (ref 74–99)
HEMATOCRIT (%) IN BLOOD BY AUTOMATED COUNT: 35.7 % (ref 41–52)
HEMOGLOBIN (G/DL) IN BLOOD: 11.6 G/DL (ref 13.5–17.5)
IMMATURE GRANULOCYTES/100 LEUKOCYTES IN BLOOD BY AUTOMATED COUNT: 0.5 % (ref 0–0.9)
LDL: 34 MG/DL (ref 0–99)
LEUKOCYTES (10*3/UL) IN BLOOD BY AUTOMATED COUNT: 8.3 X10E9/L (ref 4.4–11.3)
LYMPHOCYTES (10*3/UL) IN BLOOD BY AUTOMATED COUNT: 1.95 X10E9/L (ref 0.8–3)
LYMPHOCYTES/100 LEUKOCYTES IN BLOOD BY AUTOMATED COUNT: 23.4 % (ref 13–44)
MONOCYTES (10*3/UL) IN BLOOD BY AUTOMATED COUNT: 0.79 X10E9/L (ref 0.05–0.8)
MONOCYTES/100 LEUKOCYTES IN BLOOD BY AUTOMATED COUNT: 9.5 % (ref 2–10)
NEUTROPHILS (10*3/UL) IN BLOOD BY AUTOMATED COUNT: 5.23 X10E9/L (ref 1.6–5.5)
NEUTROPHILS/100 LEUKOCYTES IN BLOOD BY AUTOMATED COUNT: 62.9 % (ref 40–80)
NON HDL CHOLESTEROL: 103 MG/DL
PLATELETS (10*3/UL) IN BLOOD AUTOMATED COUNT: 198 X10E9/L (ref 150–450)
POTASSIUM (MMOL/L) IN SER/PLAS: 3.8 MMOL/L (ref 3.5–5.3)
PROTEIN TOTAL: 6.6 G/DL (ref 6.4–8.2)
SODIUM (MMOL/L) IN SER/PLAS: 137 MMOL/L (ref 136–145)
THYROTROPIN (MIU/L) IN SER/PLAS BY DETECTION LIMIT <= 0.05 MIU/L: 2.31 MIU/L (ref 0.44–3.98)
TRIGLYCERIDE (MG/DL) IN SER/PLAS: 349 MG/DL (ref 0–149)
UREA NITROGEN (MG/DL) IN SER/PLAS: 20 MG/DL (ref 6–23)
VLDL: 70 MG/DL (ref 0–40)

## 2023-09-13 PROCEDURE — 80053 COMPREHEN METABOLIC PANEL: CPT

## 2023-09-13 PROCEDURE — 82652 VIT D 1 25-DIHYDROXY: CPT

## 2023-09-13 PROCEDURE — 84443 ASSAY THYROID STIM HORMONE: CPT

## 2023-09-13 PROCEDURE — 80061 LIPID PANEL: CPT

## 2023-09-13 PROCEDURE — 36415 COLL VENOUS BLD VENIPUNCTURE: CPT

## 2023-09-13 PROCEDURE — 85025 COMPLETE CBC W/AUTO DIFF WBC: CPT

## 2023-09-16 LAB — VITAMIN D 1,25-DIHYDROXY: 30.8 PG/ML (ref 19.9–79.3)

## 2023-09-18 ENCOUNTER — OFFICE VISIT (OUTPATIENT)
Dept: PRIMARY CARE | Facility: CLINIC | Age: 88
End: 2023-09-18
Payer: MEDICARE

## 2023-09-18 VITALS
OXYGEN SATURATION: 96 % | WEIGHT: 215.2 LBS | TEMPERATURE: 96.9 F | DIASTOLIC BLOOD PRESSURE: 64 MMHG | HEART RATE: 49 BPM | BODY MASS INDEX: 35.81 KG/M2 | SYSTOLIC BLOOD PRESSURE: 112 MMHG

## 2023-09-18 DIAGNOSIS — I10 BENIGN ESSENTIAL HYPERTENSION: ICD-10-CM

## 2023-09-18 DIAGNOSIS — E66.01 CLASS 2 SEVERE OBESITY WITH SERIOUS COMORBIDITY AND BODY MASS INDEX (BMI) OF 35.0 TO 35.9 IN ADULT, UNSPECIFIED OBESITY TYPE (MULTI): ICD-10-CM

## 2023-09-18 DIAGNOSIS — F32.5 DEPRESSION, MAJOR, IN REMISSION (CMS-HCC): ICD-10-CM

## 2023-09-18 DIAGNOSIS — E11.65 TYPE 2 DIABETES MELLITUS WITH HYPERGLYCEMIA, WITHOUT LONG-TERM CURRENT USE OF INSULIN (MULTI): Primary | ICD-10-CM

## 2023-09-18 DIAGNOSIS — R00.1 BRADYCARDIA: ICD-10-CM

## 2023-09-18 DIAGNOSIS — E78.5 HYPERLIPIDEMIA, UNSPECIFIED HYPERLIPIDEMIA TYPE: ICD-10-CM

## 2023-09-18 DIAGNOSIS — Z23 FLU VACCINE NEED: ICD-10-CM

## 2023-09-18 LAB
POC FINGERSTICK BLOOD GLUCOSE: 246 MG/DL (ref 70–100)
POC HEMOGLOBIN A1C: 7.8 % (ref 4.2–6.5)

## 2023-09-18 PROCEDURE — 82962 GLUCOSE BLOOD TEST: CPT | Performed by: NURSE PRACTITIONER

## 2023-09-18 PROCEDURE — 93000 ELECTROCARDIOGRAM COMPLETE: CPT | Performed by: NURSE PRACTITIONER

## 2023-09-18 PROCEDURE — 90662 IIV NO PRSV INCREASED AG IM: CPT | Performed by: NURSE PRACTITIONER

## 2023-09-18 PROCEDURE — 1036F TOBACCO NON-USER: CPT | Performed by: NURSE PRACTITIONER

## 2023-09-18 PROCEDURE — G0008 ADMIN INFLUENZA VIRUS VAC: HCPCS | Performed by: NURSE PRACTITIONER

## 2023-09-18 PROCEDURE — 83036 HEMOGLOBIN GLYCOSYLATED A1C: CPT | Performed by: NURSE PRACTITIONER

## 2023-09-18 PROCEDURE — 3074F SYST BP LT 130 MM HG: CPT | Performed by: NURSE PRACTITIONER

## 2023-09-18 PROCEDURE — 99214 OFFICE O/P EST MOD 30 MIN: CPT | Performed by: NURSE PRACTITIONER

## 2023-09-18 PROCEDURE — 1160F RVW MEDS BY RX/DR IN RCRD: CPT | Performed by: NURSE PRACTITIONER

## 2023-09-18 PROCEDURE — 3078F DIAST BP <80 MM HG: CPT | Performed by: NURSE PRACTITIONER

## 2023-09-18 PROCEDURE — 1159F MED LIST DOCD IN RCRD: CPT | Performed by: NURSE PRACTITIONER

## 2023-09-18 ASSESSMENT — ENCOUNTER SYMPTOMS
RHINORRHEA: 1
WHEEZING: 0
ALLERGIC/IMMUNOLOGIC NEGATIVE: 1
ACTIVITY CHANGE: 0
SHORTNESS OF BREATH: 0
UNEXPECTED WEIGHT CHANGE: 0
PALPITATIONS: 0
SPEECH DIFFICULTY: 0
GASTROINTESTINAL NEGATIVE: 1
DIZZINESS: 0
NAUSEA: 0
DEPRESSION: 1
ENDOCRINE NEGATIVE: 1
ARTHRALGIAS: 1
HEADACHES: 0
LOSS OF SENSATION IN FEET: 1
DIARRHEA: 0
HEMATOLOGIC/LYMPHATIC NEGATIVE: 1
CHILLS: 0
ABDOMINAL PAIN: 0
FATIGUE: 0
OCCASIONAL FEELINGS OF UNSTEADINESS: 1
EYES NEGATIVE: 1
NUMBNESS: 0
CONSTIPATION: 0
BACK PAIN: 1
VOMITING: 0
SORE THROAT: 0
COUGH: 0
PSYCHIATRIC NEGATIVE: 1

## 2023-09-18 NOTE — PROGRESS NOTES
Subjective   Patient ID: Dmitriy Delgado is a 89 y.o. male who presents for Diabetes, Hypertension, Hyperlipidemia, GERD, Sinusitis (Runny nose x 1 week), Results, and Med Refill.    Bradycardia, heart rate 49.  Patient denies chest pain, palpitations.  EKG shows sinus bradycardia, intra-atrial conduction delay.  Taking atenolol-chlorthalidone 50-25 mg daily, has been taking for many years.  Abnormal EKG, referral to cardiology.  Allergic rhinitis, recommend over-the-counter antihistamine such as Zyrtec or Claritin  Complains of multiple joint aches and pains, worse in his knees. Rarely takes Tylenol. Recommend Tylenol 650 mg 2 tabs every 12 hours as needed for pain. He is not interested in physical therapy. Seeing ortho for injections. Helps for a while.  GERD controlled with omeprazole 20 mg daily.  A1c 7.8%, taking glyburide-metformin  mg and Tradjenta 5 mg daily. Does not want to take any other pills. Discussed lifestyle changes including portion control, food choices, and increased activity.  PVD, neuropathy. Complains of some tingling in his feet. Denies cramping. Needs to return to podiatry for regular diabetic foot care.  History of prostate cancer, patient follows with urology  Depression, declines medication or referral for counseling.    Preventive:  CRC screen:  Ophthalmology:  Podiatry:  Urine albumin:    Office Visit on 09/18/2023  POC Fingerstick Blood Glucose                 Date: 09/18/2023  Value: 246 (A)     Ref range: 70 - 100 mg/dl     Status: Final  POC HEMOGLOBIN A1c                            Date: 09/18/2023  Value: 7.8 (A)     Ref range: 4.2 - 6.5 %        Status: Final  ------------  Lab on 09/13/2023  WBC                                           Date: 09/13/2023  Value: 8.3         Ref range: 4.4 - 11.3 x10E9*  Status: Final  RBC                                           Date: 09/13/2023  Value: 3.67 (L)    Ref range: 4.50 - 5.90 x10E*  Status: Final  Hemoglobin                                     Date: 09/13/2023  Value: 11.6 (L)    Ref range: 13.5 - 17.5 g/dL   Status: Final  Hematocrit                                    Date: 09/13/2023  Value: 35.7 (L)    Ref range: 41.0 - 52.0 %      Status: Final  MCV                                           Date: 09/13/2023  Value: 97          Ref range: 80 - 100 fL        Status: Final  MCHC                                          Date: 09/13/2023  Value: 32.5        Ref range: 32.0 - 36.0 g/dL   Status: Final  Platelets                                     Date: 09/13/2023  Value: 198         Ref range: 150 - 450 x10E9/L  Status: Final  RDW                                           Date: 09/13/2023  Value: 12.7        Ref range: 11.5 - 14.5 %      Status: Final  Neutrophils %                                 Date: 09/13/2023  Value: 62.9        Ref range: 40.0 - 80.0 %      Status: Final  Immature Granulocytes %, Automated            Date: 09/13/2023  Value: 0.5         Ref range: 0.0 - 0.9 %        Status: Final                Comment:  Immature Granulocyte Count (IG) includes promyelocytes,    myelocytes and metamyelocytes but does not include bands.   Percent differential counts (%) should be interpreted in the   context of the absolute cell counts (cells/L).  Lymphocytes %                                 Date: 09/13/2023  Value: 23.4        Ref range: 13.0 - 44.0 %      Status: Final  Monocytes %                                   Date: 09/13/2023  Value: 9.5         Ref range: 2.0 - 10.0 %       Status: Final  Eosinophils %                                 Date: 09/13/2023  Value: 3.2         Ref range: 0.0 - 6.0 %        Status: Final  Basophils %                                   Date: 09/13/2023  Value: 0.5         Ref range: 0.0 - 2.0 %        Status: Final  Neutrophils Absolute                          Date: 09/13/2023  Value: 5.23        Ref range: 1.60 - 5.50 x10E*  Status: Final  Lymphocytes Absolute                          Date:  09/13/2023  Value: 1.95        Ref range: 0.80 - 3.00 x10E*  Status: Final  Monocytes Absolute                            Date: 09/13/2023  Value: 0.79        Ref range: 0.05 - 0.80 x10E*  Status: Final  Eosinophils Absolute                          Date: 09/13/2023  Value: 0.27        Ref range: 0.00 - 0.40 x10E*  Status: Final  Basophils Absolute                            Date: 09/13/2023  Value: 0.04        Ref range: 0.00 - 0.10 x10E*  Status: Final  Glucose                                       Date: 09/13/2023  Value: 233 (H)     Ref range: 74 - 99 mg/dL      Status: Final  Sodium                                        Date: 09/13/2023  Value: 137         Ref range: 136 - 145 mmol/L   Status: Final  Potassium                                     Date: 09/13/2023  Value: 3.8         Ref range: 3.5 - 5.3 mmol/L   Status: Final  Chloride                                      Date: 09/13/2023  Value: 97 (L)      Ref range: 98 - 107 mmol/L    Status: Final  Bicarbonate                                   Date: 09/13/2023  Value: 29          Ref range: 21 - 32 mmol/L     Status: Final  Anion Gap                                     Date: 09/13/2023  Value: 15          Ref range: 10 - 20 mmol/L     Status: Final  Urea Nitrogen                                 Date: 09/13/2023  Value: 20          Ref range: 6 - 23 mg/dL       Status: Final  Creatinine                                    Date: 09/13/2023  Value: 1.14        Ref range: 0.50 - 1.30 mg/dL  Status: Final  GFR MALE                                      Date: 09/13/2023  Value: 61          Ref range: >90 mL/min/1.73m2  Status: Final                Comment:  CALCULATIONS OF ESTIMATED GFR ARE PERFORMED   USING THE 2021 CKD-EPI STUDY REFIT EQUATION   WITHOUT THE RACE VARIABLE FOR THE IDMS-TRACEABLE   CREATININE METHODS.    https://jasn.asnjournals.org/content/early/2021/09/22/ASN.3964174340  Calcium                                       Date: 09/13/2023  Value: 9.8          Ref range: 8.6 - 10.3 mg/dL   Status: Final  Albumin                                       Date: 09/13/2023  Value: 4.0         Ref range: 3.4 - 5.0 g/dL     Status: Final  Alkaline Phosphatase                          Date: 09/13/2023  Value: 64          Ref range: 33 - 136 U/L       Status: Final  Total Protein                                 Date: 09/13/2023  Value: 6.6         Ref range: 6.4 - 8.2 g/dL     Status: Final  AST                                           Date: 09/13/2023  Value: 20          Ref range: 9 - 39 U/L         Status: Final  Total Bilirubin                               Date: 09/13/2023  Value: 0.8         Ref range: 0.0 - 1.2 mg/dL    Status: Final  ALT (SGPT)                                    Date: 09/13/2023  Value: 19          Ref range: 10 - 52 U/L        Status: Final                Comment:  Patients treated with Sulfasalazine may generate    falsely decreased results for ALT.  Cholesterol                                   Date: 09/13/2023  Value: 137         Ref range: 0 - 199 mg/dL      Status: Final                Comment: .      AGE      DESIRABLE   BORDERLINE HIGH   HIGH     0-19 Y     0 - 169       170 - 199     >/= 200    20-24 Y     0 - 189       190 - 224     >/= 225         >24 Y     0 - 199       200 - 239     >/= 240   **All ranges are based on fasting samples. Specific   therapeutic targets will vary based on patient-specific   cardiac risk.  .   Pediatric guidelines reference:Pediatrics 2011, 128(S5).   Adult guidelines reference: NCEP ATPIII Guidelines,     RUBINA 2001, 258:2486-97  .   Venipuncture immediately after or during the    administration of Metamizole may lead to falsely   low results. Testing should be performed immediately   prior to Metamizole dosing.  HDL                                           Date: 09/13/2023  Value: 33.7 (A)    Ref range: mg/dL              Status: Final                Comment: .      AGE      VERY LOW   LOW     NORMAL    HIGH        0-19 Y       < 35   < 40     40-45     ----    20-24 Y       ----   < 40       >45     ----      >24 Y       ----   < 40     40-60      >60  .  Cholesterol/HDL Ratio                         Date: 09/13/2023  Value: 4.1           Status: Final                Comment: REF VALUES  DESIRABLE  < 3.4  HIGH RISK  > 5.0  LDL                                           Date: 09/13/2023  Value: 34          Ref range: 0 - 99 mg/dL       Status: Final                Comment: .                           NEAR      BORD      AGE      DESIRABLE  OPTIMAL    HIGH     HIGH     VERY HIGH     0-19 Y     0 - 109     ---    110-129   >/= 130     ----    20-24 Y     0 - 119     ---    120-159   >/= 160     ----      >24 Y     0 -  99   100-129  130-159   160-189     >/=190  .  VLDL                                          Date: 09/13/2023  Value: 70 (H)      Ref range: 0 - 40 mg/dL       Status: Final  Triglycerides                                 Date: 09/13/2023  Value: 349 (H)     Ref range: 0 - 149 mg/dL      Status: Final                Comment: .      AGE      DESIRABLE   BORDERLINE HIGH   HIGH     VERY HIGH   0 D-90 D    19 - 174         ----         ----        ----  91 D- 9 Y     0 -  74        75 -  99     >/= 100      ----    10-19 Y     0 -  89        90 - 129     >/= 130      ----    20-24 Y     0 - 114       115 - 149     >/= 150      ----         >24 Y     0 - 149       150 - 199    200- 499    >/= 500  .   Venipuncture immediately after or during the    administration of Metamizole may lead to falsely   low results. Testing should be performed immediately   prior to Metamizole dosing.  Non HDL Cholesterol                           Date: 09/13/2023  Value: 103         Ref range: mg/dL              Status: Final                Comment:     AGE      DESIRABLE   BORDERLINE HIGH   HIGH     VERY HIGH     0-19 Y     0 - 119       120 - 144     >/= 145    >/= 160    20-24 Y     0 - 149       150 - 189     >/= 190      ----          >24 Y    30 MG/DL ABOVE LDL CHOLESTEROL GOAL  .  TSH                                           Date: 09/13/2023  Value: 2.31        Ref range: 0.44 - 3.98 mIU/L  Status: Final                Comment:  TSH testing is performed using different testing    methodology at [facility] than at other    [facility]. Direct result comparisons should    only be made within the same method.  Vit D, 1,25-Dihydroxy                         Date: 09/13/2023  Value: 30.8        Ref range: 19.9 - 79.3 pg/mL  Status: Final                Comment: INTERPRETIVE INFORMATION: Vitamin D, 1,25-Dihydroxy  This test is primarily indicated during patient evaluation for   hypercalcemia and renal failure. A normal result does not rule out   Vitamin D deficiency. The recommended test for diagnosing Vitamin   D deficiency is Vitamin D 25-hydroxy.  Performed By: [facility]  [address] [place] [address]  : Uvaldo Ellis MD, PhD  CLIA Number: [id number]       Review of Systems   Constitutional:  Negative for activity change, chills, fatigue and unexpected weight change.   HENT:  Positive for hearing loss and rhinorrhea. Negative for congestion, ear pain and sore throat.    Eyes: Negative.    Respiratory:  Negative for cough, shortness of breath and wheezing.    Cardiovascular:  Negative for chest pain, palpitations and leg swelling.   Gastrointestinal: Negative.  Negative for abdominal pain, constipation, diarrhea, nausea and vomiting.   Endocrine: Negative.    Genitourinary: Negative.    Musculoskeletal:  Positive for arthralgias, back pain and gait problem.   Skin: Negative.    Allergic/Immunologic: Negative.    Neurological:  Negative for dizziness, speech difficulty, numbness and headaches.   Hematological: Negative.    Psychiatric/Behavioral: Negative.     All other systems reviewed and are negative.      Objective   /64   Pulse (!) 49   Temp 36.1 °C (96.9 °F)   Wt 97.6  kg (215 lb 3.2 oz)   SpO2 96%   BMI 35.81 kg/m²     Physical Exam  Vitals and nursing note reviewed.   Constitutional:       General: He is not in acute distress.     Appearance: Normal appearance. He is obese. He is not ill-appearing.   HENT:      Head: Normocephalic and atraumatic.      Right Ear: Tympanic membrane, ear canal and external ear normal.      Left Ear: Tympanic membrane, ear canal and external ear normal.      Nose: Nose normal.      Mouth/Throat:      Mouth: Mucous membranes are moist.      Pharynx: Oropharynx is clear.   Eyes:      Extraocular Movements: Extraocular movements intact.      Conjunctiva/sclera: Conjunctivae normal.      Pupils: Pupils are equal, round, and reactive to light.   Cardiovascular:      Rate and Rhythm: Regular rhythm. Bradycardia present.      Pulses: Normal pulses.      Heart sounds: Normal heart sounds. No murmur heard.  Pulmonary:      Effort: Pulmonary effort is normal. No respiratory distress.      Breath sounds: Normal breath sounds. No wheezing.   Abdominal:      General: Bowel sounds are normal. There is no distension.      Palpations: Abdomen is soft.      Tenderness: There is no abdominal tenderness.   Musculoskeletal:         General: No tenderness. Normal range of motion.      Cervical back: Normal range of motion and neck supple.      Right lower leg: No edema.      Left lower leg: No edema.      Comments: Using cane   Skin:     General: Skin is warm and dry.      Capillary Refill: Capillary refill takes less than 2 seconds.   Neurological:      General: No focal deficit present.      Mental Status: He is alert and oriented to person, place, and time.   Psychiatric:         Mood and Affect: Mood normal.         Behavior: Behavior normal.         Thought Content: Thought content normal.         Judgment: Judgment normal.       Assessment/Plan     #Bradycardia, abnormal EKG  -Referral to cardiology  # Hypertension, controlled  -continue  atenolol-chlorthalidone  -Low fat/cholesterol, low sodium, low carbohydrate diet  -Exercise as tolerated 150 minutes/week, increase activity slowly  -Weight loss   # Diabetes, A1c 7.8%  -glyburide/metformin 10 mg/1000 mg 2 times daily, be sure to take twice a day  -continue Tradjenta 5 mg daily  -better glycemic control, decrease carbohydrate intake, increase activity level gradually  -Low fat/cholesterol, low sodium, carbohydrate controlled diet  -Exercise as tolerated 150 minutes/week, increase activity slowly  -Weight loss  # Hyperlipidemia  -continue atorvastatin 40 mg daily  -low fat/low cholesterol diet  -eat more fresh foods  -avoid processed/pre-packaged/fast foods  -increase physical activity  -monitor lipid panel  -weight loss  # Joint pain  -Tylenol 650 mg every 6 hours as needed for pain or ibuprofen 200 mg take one tab 2 times a day as needed for pain, gentle stretching exercises, referral to PT  -follow with ortho  # Diabetic foot care and onychomycosis  -follow with podiatry  # Vitamin D deficiency  -continue vitamin D 125 mcg daily  -monitor vitamin D  # Hx prostate cancer  -follow with urology  # PVD  -monitor  # Squamous cell carcinoma  -following with surgery  # Obesity, BMI 35  -Avoid sweets and sugary drinks  -Drink plenty of water  -Avoid processed foods and refined foods  -Eat fruits, vegetables, lean protein, whole grains  -Increase your activity level      Follow-up in 3 months and as needed   Patient was identified as a fall risk. Risk prevention instructions provided.

## 2023-09-18 NOTE — PATIENT INSTRUCTIONS

## 2023-10-03 DIAGNOSIS — E78.5 HYPERLIPIDEMIA, UNSPECIFIED HYPERLIPIDEMIA TYPE: ICD-10-CM

## 2023-10-03 DIAGNOSIS — I10 BENIGN ESSENTIAL HYPERTENSION: ICD-10-CM

## 2023-10-03 RX ORDER — ATENOLOL AND CHLORTHALIDONE TABLET 50; 25 MG/1; MG/1
1 TABLET ORAL DAILY
Qty: 14 TABLET | Refills: 0 | Status: SHIPPED | OUTPATIENT
Start: 2023-10-03 | End: 2023-10-10 | Stop reason: SINTOL

## 2023-10-03 RX ORDER — ATORVASTATIN CALCIUM 40 MG/1
40 TABLET, FILM COATED ORAL DAILY
Qty: 14 TABLET | Refills: 0 | Status: SHIPPED | OUTPATIENT
Start: 2023-10-03 | End: 2023-10-10 | Stop reason: SDUPTHER

## 2023-10-10 ENCOUNTER — OFFICE VISIT (OUTPATIENT)
Dept: CARDIOLOGY | Facility: CLINIC | Age: 88
End: 2023-10-10
Payer: MEDICARE

## 2023-10-10 VITALS
OXYGEN SATURATION: 98 % | HEIGHT: 69 IN | HEART RATE: 62 BPM | WEIGHT: 212 LBS | DIASTOLIC BLOOD PRESSURE: 75 MMHG | BODY MASS INDEX: 31.4 KG/M2 | SYSTOLIC BLOOD PRESSURE: 162 MMHG

## 2023-10-10 DIAGNOSIS — I10 BENIGN ESSENTIAL HYPERTENSION: ICD-10-CM

## 2023-10-10 DIAGNOSIS — E78.5 HYPERLIPIDEMIA, UNSPECIFIED HYPERLIPIDEMIA TYPE: ICD-10-CM

## 2023-10-10 DIAGNOSIS — E78.2 MIXED HYPERLIPIDEMIA: Primary | ICD-10-CM

## 2023-10-10 DIAGNOSIS — R00.1 SINUS BRADYCARDIA: ICD-10-CM

## 2023-10-10 PROCEDURE — 1160F RVW MEDS BY RX/DR IN RCRD: CPT | Performed by: NURSE PRACTITIONER

## 2023-10-10 PROCEDURE — 3077F SYST BP >= 140 MM HG: CPT | Performed by: NURSE PRACTITIONER

## 2023-10-10 PROCEDURE — 3078F DIAST BP <80 MM HG: CPT | Performed by: NURSE PRACTITIONER

## 2023-10-10 PROCEDURE — 1036F TOBACCO NON-USER: CPT | Performed by: NURSE PRACTITIONER

## 2023-10-10 PROCEDURE — 99214 OFFICE O/P EST MOD 30 MIN: CPT | Performed by: NURSE PRACTITIONER

## 2023-10-10 PROCEDURE — 1159F MED LIST DOCD IN RCRD: CPT | Performed by: NURSE PRACTITIONER

## 2023-10-10 RX ORDER — ATENOLOL AND CHLORTHALIDONE TABLET 50; 25 MG/1; MG/1
1 TABLET ORAL DAILY
Qty: 90 TABLET | Refills: 0 | OUTPATIENT
Start: 2023-10-10

## 2023-10-10 RX ORDER — LOSARTAN POTASSIUM 50 MG/1
50 TABLET ORAL DAILY
Qty: 30 TABLET | Refills: 11 | Status: SHIPPED | OUTPATIENT
Start: 2023-10-10 | End: 2024-10-09

## 2023-10-10 RX ORDER — CHLORTHALIDONE 25 MG/1
25 TABLET ORAL DAILY
Qty: 30 TABLET | Refills: 11 | Status: SHIPPED | OUTPATIENT
Start: 2023-10-10 | End: 2024-10-09

## 2023-10-10 RX ORDER — ATORVASTATIN CALCIUM 40 MG/1
40 TABLET, FILM COATED ORAL DAILY
Qty: 90 TABLET | Refills: 0 | Status: SHIPPED | OUTPATIENT
Start: 2023-10-10 | End: 2024-01-15 | Stop reason: SDUPTHER

## 2023-10-10 NOTE — ASSESSMENT & PLAN NOTE
Atenolol / Chlorthalidone 50-25 mg daily   -- Atenolol most likely contributing to bradycardia   Change to amlodipine 5 mg daily

## 2023-10-10 NOTE — PROGRESS NOTES
Primary Care Physician: Amanda Garcia, APRN-CNP, DNP  Primary Cardiologist:      Date of Visit: 10/10/2023  1:40 PM EDT  Location of visit:  [facility]   Type of Visit: New Patient        Chief Complaint   Patient presents with    New Patient Visit     Referred by pcp for bradycardia, asymptomatic       HPI / Summary:   Dmitriy Delgado is a 89 y.o. male who presents to establish cardiac care    PMH significant for HTN, HLD, T2DM not requiring insulin    He denies prior cardiac history   Unsteady with gait instability causing mechanical falls  No dizziness . LH. Presyncope or LOC   He admits to some memory issues       12 system review is negative except as noted above  Accompanied by His friend Barney  who contributed to the history     Medical History:   Past Medical History:   Diagnosis Date    Acute upper respiratory infection, unspecified 03/08/2017    Viral URI with cough    Body mass index (BMI) 35.0-35.9, adult 12/12/2019    Body mass index (BMI) of 35.0 to 35.9 in adult    Body mass index (BMI) 36.0-36.9, adult 01/11/2021    Body mass index (BMI) of 36.0 to 36.9 in adult    Candidiasis of skin and nail 09/11/2017    Candidiasis of skin    Morbid (severe) obesity due to excess calories (CMS/HCC) 01/11/2021    Class 2 severe obesity with serious comorbidity and body mass index (BMI) of 36.0 to 36.9 in adult    Morbid (severe) obesity due to excess calories (CMS/HCC) 04/21/2021    Class 2 severe obesity with serious comorbidity and body mass index (BMI) of 35.0 to 35.9 in adult    Personal history of malignant neoplasm of prostate 04/14/2020    History of malignant neoplasm of prostate    Personal history of malignant neoplasm of prostate 06/11/2019    History of malignant neoplasm of prostate    Personal history of other endocrine, nutritional and metabolic disease 12/07/2020    History of uncontrolled diabetes    Personal history of other specified conditions 12/08/2016    History of nausea and vomiting     Unspecified abdominal pain 06/17/2016    Abdominal pain, unspecified abdominal location       Surgical History:   History reviewed. No pertinent surgical history.    Family History:   No family history on file.    Social History:   Tobacco Use: Medium Risk (10/10/2023)    Patient History     Smoking Tobacco Use: Former     Smokeless Tobacco Use: Never     Passive Exposure: Not on file                MEDICATIONS:   Current Outpatient Medications   Medication Instructions    aspirin 81 mg EC tablet 1 tablet, oral, Daily    atorvastatin (LIPITOR) 40 mg, oral, Daily    blood sugar diagnostic (OneTouch Verio test strips) strip Test once daily    blood sugar diagnostic (True Metrix Glucose Test Strip) strip USE TO CHECK BLOOD SUGAR EVERY MORNING BEFORE EATING    chlorthalidone (HYGROTON) 25 mg, oral, Daily    cholecalciferol (Vitamin D-3) 5,000 Units tablet 1 tablet, oral, Daily    FreeStyle glucose monitoring kit USE TO CHECK BLOOD SUGAR ONCE A DAY AND PRN    glyburide-metformin (Glucovance) 5-500 mg tablet 2 tablets, oral, 2 times daily with meals    Klor-Con M10 10 mEq ER tablet TAKE 1 TABLET ONCE DAILY    lancets 33 gauge misc USE TO CHECK BLOOD SUGAR EVERY MORNING BEFORE EATING    losartan (COZAAR) 50 mg, oral, Daily    Lupron Depot (6 Month) 45 mg, intramuscular, Every 6 months    multivitamin-iron-minerals-folic acid (Vision Plus Lutein) tablet 1 tablet, oral, Daily    tamsulosin (Flomax) 0.4 mg 24 hr capsule 1 capsule, oral, Daily    Tradjenta 5 mg, oral, Daily         IMAGING REVIEWED:      Echocardiogram     [facility], [address] [address]  Tel [phone] and Fax [phone]    TRANSTHORACIC ECHOCARDIOGRAM REPORT      Patient Name:     RYLAN Taylor Physician:   [id number] Carolina Tavarez MD  Study Date:       4/28/2023          Referring Physician: MARTHA SCHAEFFER  MRN/PID:          [id number]           PCP:  Accession/Order#: [id number]          Department  Location: [facility]  YOB: 1933         Fellow:  Gender:           M                  Nurse:               Beata RN  Admit Date:       4/27/2023          Sonographer:         Breanne Lawson RDCS,  RDMS  Admission Status: Inpatient -        Additional Staff:  Routine  Height:           175.26 cm          CC Report to:        Medical floor [facility]  Weight:           95.26 kg           Study Type:          Echocardiogram  BSA:              2.11 m2  Blood Pressure: 128 /63 mmHg    Diagnosis/ICD: R06.02-Shortness of breath  Indication:    SOB  Procedure/CPT: Echo Complete w Full [id number]    Patient History:  Diabetes:          Yes  Pertinent History: Chest Pain, PVD, HTN and LE Edema. pneumonia, SOB.    Study Detail: The following Echo studies were performed: 2D, M-Mode, Doppler and  color flow. Image quality for this study is poor. Technically  challenging study due to body habitus and respiratory  interference. Definity used as a contrast agent for endocardial  border definition. Total contrast used for this procedure was 2 mL  via IV push. The patient was awake.      PHYSICIAN INTERPRETATION:  Left Ventricle: The left ventricular systolic function is normal, with an estimated ejection fraction of 60-65%. There are no regional wall motion abnormalities. The left ventricular cavity size is normal. There is mild concentric left ventricular hypertrophy. Spectral Doppler shows an impaired relaxation pattern of left ventricular diastolic filling.  Left Atrium: The left atrium is normal in size.  Right Ventricle: The right ventricle is normal in size. There is normal right ventricular global systolic function.  Right Atrium: The right atrium is normal in size.  Aortic Valve: The aortic valve is probably trileaflet. There is mild to moderate aortic valve cusp calcification. There is evidence of mildly elevated transaortic gradients consistent with sclerosis of the aortic valve.  There is  no evidence of aortic valve regurgitation. The peak instantaneous gradient of the aortic valve is 14.4 mmHg.  Mitral Valve: The mitral valve is mildly thickened. There is moderate mitral annular calcification. There is trace mitral valve regurgitation.  Tricuspid Valve: The tricuspid valve is structurally normal. There is trace tricuspid regurgitation.  Pulmonic Valve: The pulmonic valve is not well visualized. There is physiologic pulmonic valve regurgitation.  Pericardium: There is a trivial pericardial effusion.  Aorta: The aortic root is normal.  Systemic Veins: The inferior vena cava appears to be of normal size. There is IVC inspiratory collapse greater than 50%.  In comparison to the previous echocardiogram(s): There are no prior studies on this patient for comparison purposes.      CONCLUSIONS:  1. Left ventricular systolic function is normal with a 60-65% estimated ejection fraction.  2. Spectral Doppler shows an impaired relaxation pattern of left ventricular diastolic filling.  3. Aortic valve sclerosis.    [id number] Carolina Tavarez MD  Electronically signed on 4/28/2023 at 4:22:28 PM        Stress Testing: No results found for this or any previous visit from the past 1825 days.    Cardiac Catheterization: No results found for this or any previous visit from the past 1825 days.    Cardiac Scoring: No results found for this or any previous visit from the past 1825 days.    AAA : No results found for this or any previous visit from the past 1825 days.    OTHER: No results found for this or any previous visit from the past 1825 days.        LABS:  CBC:   Lab Results   Component Value Date    WBC 8.3 09/13/2023    RBC 3.67 (L) 09/13/2023    HGB 11.6 (L) 09/13/2023    HCT 35.7 (L) 09/13/2023    MCV 97 09/13/2023    MCHC 32.5 09/13/2023    RDW 12.7 09/13/2023     09/13/2023     CBC with Differential:    Lab Results   Component Value Date    WBC 8.3 09/13/2023    RBC 3.67 (L) 09/13/2023    HGB 11.6 (L)  09/13/2023    HCT 35.7 (L) 09/13/2023     09/13/2023    MCV 97 09/13/2023    MCHC 32.5 09/13/2023    RDW 12.7 09/13/2023    LYMPHOPCT 23.4 09/13/2023    MONOPCT 9.5 09/13/2023    EOSPCT 3.2 09/13/2023    BASOPCT 0.5 09/13/2023    MONOSABS 0.79 09/13/2023    LYMPHSABS 1.95 09/13/2023    EOSABS 0.27 09/13/2023    BASOSABS 0.04 09/13/2023     CMP:    Lab Results   Component Value Date     09/13/2023    K 3.8 09/13/2023    CL 97 (L) 09/13/2023    CO2 29 09/13/2023    BUN 20 09/13/2023    CREATININE 1.14 09/13/2023    GLUCOSE 233 (H) 09/13/2023    PROT 6.6 09/13/2023    CALCIUM 9.8 09/13/2023    BILITOT 0.8 09/13/2023    ALKPHOS 64 09/13/2023    AST 20 09/13/2023    ALT 19 09/13/2023     BMP:    Lab Results   Component Value Date     09/13/2023    K 3.8 09/13/2023    CL 97 (L) 09/13/2023    CO2 29 09/13/2023    BUN 20 09/13/2023    CREATININE 1.14 09/13/2023    CALCIUM 9.8 09/13/2023    GLUCOSE 233 (H) 09/13/2023     Magnesium:  Lab Results   Component Value Date    MG 1.73 04/29/2023     Troponin:    Lab Results   Component Value Date    TROPHS 8 04/27/2023     BNP:   Lab Results   Component Value Date     (H) 04/28/2023       Lipid Panel:  Lab Results   Component Value Date    HDL 33.7 (A) 09/13/2023    CHHDL 4.1 09/13/2023    VLDL 70 (H) 09/13/2023    TRIG 349 (H) 09/13/2023    NHDL 103 09/13/2023        Lab work and imaging results independently reviewed by me     ECG dated 9/18/2023 independently reviewed   SB 49,  IVCD        Constitutional:       Appearance: Healthy appearance. Not in distress.   Eyes:      Conjunctiva/sclera: Conjunctivae normal.   Neck:      Vascular: JVD normal.   Pulmonary:      Breath sounds: Normal breath sounds.   Cardiovascular:      Normal rate. Regular rhythm. Normal S1. Normal S2.       Murmurs: There is no murmur.      No gallop.  No click. No rub.   Pulses:     Intact distal pulses.   Edema:     Peripheral edema absent.   Abdominal:      General: Bowel sounds  are normal.      Palpations: Abdomen is soft.   Musculoskeletal:      Cervical back: Neck supple. Skin:     General: Skin is warm and dry.   Neurological:      Mental Status: Alert and oriented to person, place and time.               Problem List Items Addressed This Visit             ICD-10-CM    Benign essential hypertension I10     Atenolol / Chlorthalidone 50-25 mg daily   -- Atenolol most likely contributing to bradycardia   Change to amlodipine 5 mg daily          Relevant Medications    losartan (Cozaar) 50 mg tablet    chlorthalidone (Hygroton) 25 mg tablet    Hyperlipidemia - Primary E78.5     Most recent LDL 34 with ASCVD on CT chest + T2DM   Atorvastatin 40 mg HS   We discussed primary prevention measures and risk factor control           Sinus bradycardia R00.1     Asymptomatic             Continue close follow up with PCP, calling cardiology as needed     KRISTINA Crain         Followup Appts:  Future Appointments   Date Time Provider Department Center   12/7/2023 10:00 AM KRISTINA Pearce, DNP [id number]

## 2023-10-10 NOTE — PATIENT INSTRUCTIONS
Atenolol - chlorthalidone is 2 medicines in 1 pill   I think the atenolol is making your heart rate too slow     The new Rx is for chlorthalidone by itself   New Rx is losartan 50 mg daily--- this will not slow your heart rate down

## 2023-10-10 NOTE — ASSESSMENT & PLAN NOTE
Most recent LDL 34 with ASCVD on CT chest + T2DM   Atorvastatin 40 mg HS   We discussed primary prevention measures and risk factor control

## 2023-11-28 DIAGNOSIS — E87.6 HYPOKALEMIA: ICD-10-CM

## 2023-11-28 RX ORDER — POTASSIUM CHLORIDE 750 MG/1
TABLET, EXTENDED RELEASE ORAL
Qty: 90 TABLET | Refills: 0 | Status: SHIPPED | OUTPATIENT
Start: 2023-11-28 | End: 2024-03-25

## 2023-12-07 ENCOUNTER — OFFICE VISIT (OUTPATIENT)
Dept: PRIMARY CARE | Facility: CLINIC | Age: 88
End: 2023-12-07
Payer: MEDICARE

## 2023-12-07 VITALS
HEART RATE: 86 BPM | SYSTOLIC BLOOD PRESSURE: 142 MMHG | TEMPERATURE: 97.3 F | DIASTOLIC BLOOD PRESSURE: 64 MMHG | OXYGEN SATURATION: 97 %

## 2023-12-07 DIAGNOSIS — G89.29 CHRONIC PAIN OF BOTH KNEES: ICD-10-CM
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
## 2023-12-07 DIAGNOSIS — M25.562 CHRONIC PAIN OF BOTH KNEES: ICD-10-CM

## 2023-12-07 DIAGNOSIS — M25.561 CHRONIC PAIN OF BOTH KNEES: ICD-10-CM

## 2023-12-07 DIAGNOSIS — E11.65 TYPE 2 DIABETES MELLITUS WITH HYPERGLYCEMIA, WITHOUT LONG-TERM CURRENT USE OF INSULIN (MULTI): Primary | ICD-10-CM

## 2023-12-07 PROCEDURE — 3077F SYST BP >= 140 MM HG: CPT | Performed by: NURSE PRACTITIONER

## 2023-12-07 PROCEDURE — 3078F DIAST BP <80 MM HG: CPT | Performed by: NURSE PRACTITIONER

## 2023-12-07 PROCEDURE — 1160F RVW MEDS BY RX/DR IN RCRD: CPT | Performed by: NURSE PRACTITIONER

## 2023-12-07 PROCEDURE — 1036F TOBACCO NON-USER: CPT | Performed by: NURSE PRACTITIONER

## 2023-12-07 PROCEDURE — 1159F MED LIST DOCD IN RCRD: CPT | Performed by: NURSE PRACTITIONER

## 2023-12-07 PROCEDURE — 99214 OFFICE O/P EST MOD 30 MIN: CPT | Performed by: NURSE PRACTITIONER

## 2023-12-07 ASSESSMENT — ENCOUNTER SYMPTOMS
WHEEZING: 0
NUMBNESS: 0
VOMITING: 0
ARTHRALGIAS: 1
SHORTNESS OF BREATH: 0
RHINORRHEA: 1
SPEECH DIFFICULTY: 0
DIARRHEA: 0
ABDOMINAL PAIN: 0
CONSTIPATION: 0
FATIGUE: 0
UNEXPECTED WEIGHT CHANGE: 0
ENDOCRINE NEGATIVE: 1
ALLERGIC/IMMUNOLOGIC NEGATIVE: 1
CHILLS: 0
NAUSEA: 0
COUGH: 0
HEMATOLOGIC/LYMPHATIC NEGATIVE: 1
DIZZINESS: 0
GASTROINTESTINAL NEGATIVE: 1
ACTIVITY CHANGE: 0
BACK PAIN: 1
SORE THROAT: 0
HEADACHES: 0
PALPITATIONS: 0
EYES NEGATIVE: 1
PSYCHIATRIC NEGATIVE: 1

## 2023-12-07 NOTE — PROGRESS NOTES
Subjective   Patient ID: Dmitriy Delgaod is a 89 y.o. male who presents for Diabetes, Hyperlipidemia, and Hypertension.    Fall a couple weeks ago, tripped over PJ bottoms, states he was bruised but no other injuries.  Uses cane, has balance issues.  Referral to physical therapy.  Diabetes, A1c 7.8%, taking glyburide-metformin  mg and Tradjenta 5 mg daily. Does not want to take any other pills. Discussed lifestyle changes including portion control, food choices, and increased activity.  Hypertension, controlled, continue losartan 50 mg daily and chlorthalidone 25 mg daily.  Dyslipidemia, controlled, continue atorvastatin 40 mg daily.  Bradycardia, improved.  Atenolol was stopped by cardiology, started on losartan 50 mg daily.  Allergic rhinitis, recommend over-the-counter antihistamine such as Zyrtec or Claritin  Complains of multiple joint aches and pains, worse in his knees. Rarely takes Tylenol. Recommend Tylenol 650 mg 2 tabs every 12 hours as needed for pain.  Agrees to referral to PT.  Seeing ortho for injections. Helps for a while.  GERD controlled with omeprazole 20 mg daily.  PVD, neuropathy. Complains of some tingling in his feet. Denies cramping. Needs to return to podiatry for regular diabetic foot care.  History of prostate cancer, patient follows with urology  Depression, declines medication or referral for counseling.    Preventive:  CRC screen:  Ophthalmology:  Podiatry:  Urine albumin: 8/2022    Office Visit on 09/18/2023  POC Fingerstick Blood Glucose                 Date: 09/18/2023  Value: 246 (A)     Ref range: 70 - 100 mg/dl     Status: Final  POC HEMOGLOBIN A1c                            Date: 09/18/2023  Value: 7.8 (A)     Ref range: 4.2 - 6.5 %        Status: Final  ------------  Lab on 09/13/2023  WBC                                           Date: 09/13/2023  Value: 8.3         Ref range: 4.4 - 11.3 x10E9*  Status: Final  RBC                                           Date:  09/13/2023  Value: 3.67 (L)    Ref range: 4.50 - 5.90 x10E*  Status: Final  Hemoglobin                                    Date: 09/13/2023  Value: 11.6 (L)    Ref range: 13.5 - 17.5 g/dL   Status: Final  Hematocrit                                    Date: 09/13/2023  Value: 35.7 (L)    Ref range: 41.0 - 52.0 %      Status: Final  MCV                                           Date: 09/13/2023  Value: 97          Ref range: 80 - 100 fL        Status: Final  MCHC                                          Date: 09/13/2023  Value: 32.5        Ref range: 32.0 - 36.0 g/dL   Status: Final  Platelets                                     Date: 09/13/2023  Value: 198         Ref range: 150 - 450 x10E9/L  Status: Final  RDW                                           Date: 09/13/2023  Value: 12.7        Ref range: 11.5 - 14.5 %      Status: Final  Neutrophils %                                 Date: 09/13/2023  Value: 62.9        Ref range: 40.0 - 80.0 %      Status: Final  Immature Granulocytes %, Automated            Date: 09/13/2023  Value: 0.5         Ref range: 0.0 - 0.9 %        Status: Final                Comment:  Immature Granulocyte Count (IG) includes promyelocytes,    myelocytes and metamyelocytes but does not include bands.   Percent differential counts (%) should be interpreted in the   context of the absolute cell counts (cells/L).  Lymphocytes %                                 Date: 09/13/2023  Value: 23.4        Ref range: 13.0 - 44.0 %      Status: Final  Monocytes %                                   Date: 09/13/2023  Value: 9.5         Ref range: 2.0 - 10.0 %       Status: Final  Eosinophils %                                 Date: 09/13/2023  Value: 3.2         Ref range: 0.0 - 6.0 %        Status: Final  Basophils %                                   Date: 09/13/2023  Value: 0.5         Ref range: 0.0 - 2.0 %        Status: Final  Neutrophils Absolute                          Date: 09/13/2023  Value: 5.23         Ref range: 1.60 - 5.50 x10E*  Status: Final  Lymphocytes Absolute                          Date: 09/13/2023  Value: 1.95        Ref range: 0.80 - 3.00 x10E*  Status: Final  Monocytes Absolute                            Date: 09/13/2023  Value: 0.79        Ref range: 0.05 - 0.80 x10E*  Status: Final  Eosinophils Absolute                          Date: 09/13/2023  Value: 0.27        Ref range: 0.00 - 0.40 x10E*  Status: Final  Basophils Absolute                            Date: 09/13/2023  Value: 0.04        Ref range: 0.00 - 0.10 x10E*  Status: Final  Glucose                                       Date: 09/13/2023  Value: 233 (H)     Ref range: 74 - 99 mg/dL      Status: Final  Sodium                                        Date: 09/13/2023  Value: 137         Ref range: 136 - 145 mmol/L   Status: Final  Potassium                                     Date: 09/13/2023  Value: 3.8         Ref range: 3.5 - 5.3 mmol/L   Status: Final  Chloride                                      Date: 09/13/2023  Value: 97 (L)      Ref range: 98 - 107 mmol/L    Status: Final  Bicarbonate                                   Date: 09/13/2023  Value: 29          Ref range: 21 - 32 mmol/L     Status: Final  Anion Gap                                     Date: 09/13/2023  Value: 15          Ref range: 10 - 20 mmol/L     Status: Final  Urea Nitrogen                                 Date: 09/13/2023  Value: 20          Ref range: 6 - 23 mg/dL       Status: Final  Creatinine                                    Date: 09/13/2023  Value: 1.14        Ref range: 0.50 - 1.30 mg/dL  Status: Final  GFR MALE                                      Date: 09/13/2023  Value: 61          Ref range: >90 mL/min/1.73m2  Status: Final                Comment:  CALCULATIONS OF ESTIMATED GFR ARE PERFORMED   USING THE 2021 CKD-EPI STUDY REFIT EQUATION   WITHOUT THE RACE VARIABLE FOR THE IDMS-TRACEABLE   CREATININE  METHODS.    https://jasn.asnjournals.org/content/early/2021/09/22/ASN.7840321660  Calcium                                       Date: 09/13/2023  Value: 9.8         Ref range: 8.6 - 10.3 mg/dL   Status: Final  Albumin                                       Date: 09/13/2023  Value: 4.0         Ref range: 3.4 - 5.0 g/dL     Status: Final  Alkaline Phosphatase                          Date: 09/13/2023  Value: 64          Ref range: 33 - 136 U/L       Status: Final  Total Protein                                 Date: 09/13/2023  Value: 6.6         Ref range: 6.4 - 8.2 g/dL     Status: Final  AST                                           Date: 09/13/2023  Value: 20          Ref range: 9 - 39 U/L         Status: Final  Total Bilirubin                               Date: 09/13/2023  Value: 0.8         Ref range: 0.0 - 1.2 mg/dL    Status: Final  ALT (SGPT)                                    Date: 09/13/2023  Value: 19          Ref range: 10 - 52 U/L        Status: Final                Comment:  Patients treated with Sulfasalazine may generate    falsely decreased results for ALT.  Cholesterol                                   Date: 09/13/2023  Value: 137         Ref range: 0 - 199 mg/dL      Status: Final                Comment: .      AGE      DESIRABLE   BORDERLINE HIGH   HIGH     0-19 Y     0 - 169       170 - 199     >/= 200    20-24 Y     0 - 189       190 - 224     >/= 225         >24 Y     0 - 199       200 - 239     >/= 240   **All ranges are based on fasting samples. Specific   therapeutic targets will vary based on patient-specific   cardiac risk.  .   Pediatric guidelines reference:Pediatrics 2011, 128(S5).   Adult guidelines reference: NCEP ATPIII Guidelines,     RUBINA 2001, 258:2486-97  .   Venipuncture immediately after or during the    administration of Metamizole may lead to falsely   low results. Testing should be performed immediately   prior to Metamizole dosing.  HDL                                            Date: 09/13/2023  Value: 33.7 (A)    Ref range: mg/dL              Status: Final                Comment: .      AGE      VERY LOW   LOW     NORMAL    HIGH       0-19 Y       < 35   < 40     40-45     ----    20-24 Y       ----   < 40       >45     ----      >24 Y       ----   < 40     40-60      >60  .  Cholesterol/HDL Ratio                         Date: 09/13/2023  Value: 4.1           Status: Final                Comment: REF VALUES  DESIRABLE  < 3.4  HIGH RISK  > 5.0  LDL                                           Date: 09/13/2023  Value: 34          Ref range: 0 - 99 mg/dL       Status: Final                Comment: .                           NEAR      BORD      AGE      DESIRABLE  OPTIMAL    HIGH     HIGH     VERY HIGH     0-19 Y     0 - 109     ---    110-129   >/= 130     ----    20-24 Y     0 - 119     ---    120-159   >/= 160     ----      >24 Y     0 -  99   100-129  130-159   160-189     >/=190  .  VLDL                                          Date: 09/13/2023  Value: 70 (H)      Ref range: 0 - 40 mg/dL       Status: Final  Triglycerides                                 Date: 09/13/2023  Value: 349 (H)     Ref range: 0 - 149 mg/dL      Status: Final                Comment: .      AGE      DESIRABLE   BORDERLINE HIGH   HIGH     VERY HIGH   0 D-90 D    19 - 174         ----         ----        ----  91 D- 9 Y     0 -  74        75 -  99     >/= 100      ----    10-19 Y     0 -  89        90 - 129     >/= 130      ----    20-24 Y     0 - 114       115 - 149     >/= 150      ----         >24 Y     0 - 149       150 - 199    200- 499    >/= 500  .   Venipuncture immediately after or during the    administration of Metamizole may lead to falsely   low results. Testing should be performed immediately   prior to Metamizole dosing.  Non HDL Cholesterol                           Date: 09/13/2023  Value: 103         Ref range: mg/dL              Status: Final                Comment:     AGE      DESIRABLE    BORDERLINE HIGH   HIGH     VERY HIGH     0-19 Y     0 - 119       120 - 144     >/= 145    >/= 160    20-24 Y     0 - 149       150 - 189     >/= 190      ----         >24 Y    30 MG/DL ABOVE LDL CHOLESTEROL GOAL  .  TSH                                           Date: 09/13/2023  Value: 2.31        Ref range: 0.44 - 3.98 mIU/L  Status: Final                Comment:  TSH testing is performed using different testing    methodology at [facility] than at other    [facility]. Direct result comparisons should    only be made within the same method.  Vit D, 1,25-Dihydroxy                         Date: 09/13/2023  Value: 30.8        Ref range: 19.9 - 79.3 pg/mL  Status: Final                Comment: INTERPRETIVE INFORMATION: Vitamin D, 1,25-Dihydroxy  This test is primarily indicated during patient evaluation for   hypercalcemia and renal failure. A normal result does not rule out   Vitamin D deficiency. The recommended test for diagnosing Vitamin   D deficiency is Vitamin D 25-hydroxy.  Performed By: [facility]  [address] [place] [id number]  : Uvaldo Ellis MD, PhD  CLIA Number: [id number]         Review of Systems   Constitutional:  Negative for activity change, chills, fatigue and unexpected weight change.   HENT:  Positive for hearing loss and rhinorrhea. Negative for congestion, ear pain and sore throat.    Eyes: Negative.    Respiratory:  Negative for cough, shortness of breath and wheezing.    Cardiovascular:  Negative for chest pain, palpitations and leg swelling.   Gastrointestinal: Negative.  Negative for abdominal pain, constipation, diarrhea, nausea and vomiting.   Endocrine: Negative.    Genitourinary: Negative.    Musculoskeletal:  Positive for arthralgias, back pain and gait problem.   Skin: Negative.    Allergic/Immunologic: Negative.    Neurological:  Negative for dizziness, speech difficulty, numbness and headaches.   Hematological: Negative.     Psychiatric/Behavioral: Negative.     All other systems reviewed and are negative.      Objective   /64   Pulse 86   Temp 36.3 °C (97.3 °F)   SpO2 97%     Physical Exam  Vitals and nursing note reviewed.   Constitutional:       General: He is not in acute distress.     Appearance: Normal appearance. He is obese. He is not ill-appearing.   HENT:      Head: Normocephalic and atraumatic.      Right Ear: Tympanic membrane, ear canal and external ear normal.      Left Ear: Tympanic membrane, ear canal and external ear normal.      Nose: Nose normal.      Mouth/Throat:      Mouth: Mucous membranes are moist.      Pharynx: Oropharynx is clear.   Eyes:      Extraocular Movements: Extraocular movements intact.      Conjunctiva/sclera: Conjunctivae normal.      Pupils: Pupils are equal, round, and reactive to light.   Cardiovascular:      Rate and Rhythm: Regular rhythm. Bradycardia present.      Pulses: Normal pulses.      Heart sounds: Normal heart sounds. No murmur heard.  Pulmonary:      Effort: Pulmonary effort is normal. No respiratory distress.      Breath sounds: Normal breath sounds. No wheezing.   Abdominal:      General: Bowel sounds are normal. There is no distension.      Palpations: Abdomen is soft.      Tenderness: There is no abdominal tenderness.   Musculoskeletal:         General: No tenderness. Normal range of motion.      Cervical back: Normal range of motion and neck supple.      Right lower leg: No edema.      Left lower leg: No edema.      Comments: Using cane   Skin:     General: Skin is warm and dry.      Capillary Refill: Capillary refill takes less than 2 seconds.   Neurological:      General: No focal deficit present.      Mental Status: He is alert and oriented to person, place, and time.   Psychiatric:         Mood and Affect: Mood normal.         Behavior: Behavior normal.         Thought Content: Thought content normal.         Judgment: Judgment normal.       Assessment/Plan      #Bradycardia, resolved  -Follow-up with cardiology  # Hypertension, controlled  -continue losartan 50 mg daily, chlorthalidone 25 mg daily  -Low fat/cholesterol, low sodium, low carbohydrate diet  -Exercise as tolerated 150 minutes/week, increase activity slowly  -Weight loss   # Diabetes, A1c 7.8%  -glyburide/metformin 10 mg/1000 mg 2 times daily, be sure to take twice a day  -continue Tradjenta 5 mg daily  -better glycemic control, decrease carbohydrate intake, increase activity level gradually  -Low fat/cholesterol, low sodium, carbohydrate controlled diet  -Exercise as tolerated 150 minutes/week, increase activity slowly  -Weight loss  # Hyperlipidemia  -continue atorvastatin 40 mg daily  -low fat/low cholesterol diet  -eat more fresh foods  -avoid processed/pre-packaged/fast foods  -increase physical activity  -monitor lipid panel  -weight loss  # Joint pain  -Tylenol 650 mg every 6 hours as needed for pain or ibuprofen 200 mg take one tab 2 times a day as needed for pain, gentle stretching exercises, referral to PT  -follow with ortho  -Referral to PT  # Balance problems, fall  -Referral to PT  # Diabetic foot care and onychomycosis  -follow with podiatry  # Vitamin D deficiency  -continue vitamin D 125 mcg daily  -monitor vitamin D  # Hx prostate cancer  -follow with urology  # PVD  -monitor  # Squamous cell carcinoma  -following with surgery  # Obesity, BMI 31  -Avoid sweets and sugary drinks  -Drink plenty of water  -Avoid processed foods and refined foods  -Eat fruits, vegetables, lean protein, whole grains  -Increase your activity level      Follow-up in 3 months for Medicare physical and as needed   Patient was identified as a fall risk. Risk prevention instructions provided.

## 2023-12-07 NOTE — PATIENT INSTRUCTIONS

## 2023-12-18 ENCOUNTER — APPOINTMENT (OUTPATIENT)
Dept: PRIMARY CARE | Facility: CLINIC | Age: 88
End: 2023-12-18
Payer: MEDICARE

## 2024-01-15 DIAGNOSIS — E78.5 HYPERLIPIDEMIA, UNSPECIFIED HYPERLIPIDEMIA TYPE: ICD-10-CM

## 2024-01-15 RX ORDER — ATORVASTATIN CALCIUM 40 MG/1
40 TABLET, FILM COATED ORAL DAILY
Qty: 90 TABLET | Refills: 0 | Status: SHIPPED | OUTPATIENT
Start: 2024-01-15 | End: 2024-04-25 | Stop reason: SDUPTHER

## 2024-03-07 ENCOUNTER — OFFICE VISIT (OUTPATIENT)
Dept: PRIMARY CARE | Facility: CLINIC | Age: 89
End: 2024-03-07
Payer: MEDICARE

## 2024-03-07 VITALS
TEMPERATURE: 96.9 F | SYSTOLIC BLOOD PRESSURE: 128 MMHG | DIASTOLIC BLOOD PRESSURE: 80 MMHG | HEIGHT: 68 IN | BODY MASS INDEX: 31.11 KG/M2 | OXYGEN SATURATION: 96 % | HEART RATE: 92 BPM | WEIGHT: 205.3 LBS

## 2024-03-07 DIAGNOSIS — Z13.31 DEPRESSION SCREENING: ICD-10-CM

## 2024-03-07 DIAGNOSIS — Z71.89 ADVANCED DIRECTIVES, COUNSELING/DISCUSSION: ICD-10-CM

## 2024-03-07 DIAGNOSIS — E78.2 MIXED HYPERLIPIDEMIA: ICD-10-CM

## 2024-03-07 DIAGNOSIS — I50.9 CHRONIC HEART FAILURE, UNSPECIFIED HEART FAILURE TYPE (MULTI): ICD-10-CM

## 2024-03-07 DIAGNOSIS — E11.69 TYPE 2 DIABETES MELLITUS WITH OTHER SPECIFIED COMPLICATION, UNSPECIFIED WHETHER LONG TERM INSULIN USE (MULTI): ICD-10-CM

## 2024-03-07 DIAGNOSIS — E66.9 CLASS 1 OBESITY WITH SERIOUS COMORBIDITY AND BODY MASS INDEX (BMI) OF 31.0 TO 31.9 IN ADULT, UNSPECIFIED OBESITY TYPE: ICD-10-CM

## 2024-03-07 DIAGNOSIS — Z00.00 ROUTINE GENERAL MEDICAL EXAMINATION AT HEALTH CARE FACILITY: Primary | ICD-10-CM

## 2024-03-07 DIAGNOSIS — C61 MALIGNANT TUMOR OF PROSTATE (MULTI): ICD-10-CM

## 2024-03-07 DIAGNOSIS — I10 BENIGN ESSENTIAL HYPERTENSION: ICD-10-CM

## 2024-03-07 DIAGNOSIS — F32.5 DEPRESSION, MAJOR, IN REMISSION (CMS-HCC): ICD-10-CM

## 2024-03-07 LAB
POC FINGERSTICK BLOOD GLUCOSE: 344 MG/DL (ref 70–100)
POC HEMOGLOBIN A1C: 8 % (ref 4.2–6.5)

## 2024-03-07 PROCEDURE — 1159F MED LIST DOCD IN RCRD: CPT | Performed by: NURSE PRACTITIONER

## 2024-03-07 PROCEDURE — 99397 PER PM REEVAL EST PAT 65+ YR: CPT | Performed by: NURSE PRACTITIONER

## 2024-03-07 PROCEDURE — 82962 GLUCOSE BLOOD TEST: CPT | Performed by: NURSE PRACTITIONER

## 2024-03-07 PROCEDURE — 1160F RVW MEDS BY RX/DR IN RCRD: CPT | Performed by: NURSE PRACTITIONER

## 2024-03-07 PROCEDURE — 1123F ACP DISCUSS/DSCN MKR DOCD: CPT | Performed by: NURSE PRACTITIONER

## 2024-03-07 PROCEDURE — 3079F DIAST BP 80-89 MM HG: CPT | Performed by: NURSE PRACTITIONER

## 2024-03-07 PROCEDURE — 1158F ADVNC CARE PLAN TLK DOCD: CPT | Performed by: NURSE PRACTITIONER

## 2024-03-07 PROCEDURE — 1036F TOBACCO NON-USER: CPT | Performed by: NURSE PRACTITIONER

## 2024-03-07 PROCEDURE — 1170F FXNL STATUS ASSESSED: CPT | Performed by: NURSE PRACTITIONER

## 2024-03-07 PROCEDURE — G0447 BEHAVIOR COUNSEL OBESITY 15M: HCPCS | Performed by: NURSE PRACTITIONER

## 2024-03-07 PROCEDURE — G0444 DEPRESSION SCREEN ANNUAL: HCPCS | Performed by: NURSE PRACTITIONER

## 2024-03-07 PROCEDURE — 83036 HEMOGLOBIN GLYCOSYLATED A1C: CPT | Performed by: NURSE PRACTITIONER

## 2024-03-07 PROCEDURE — 3074F SYST BP LT 130 MM HG: CPT | Performed by: NURSE PRACTITIONER

## 2024-03-07 PROCEDURE — 99214 OFFICE O/P EST MOD 30 MIN: CPT | Performed by: NURSE PRACTITIONER

## 2024-03-07 PROCEDURE — G0439 PPPS, SUBSEQ VISIT: HCPCS | Performed by: NURSE PRACTITIONER

## 2024-03-07 ASSESSMENT — ENCOUNTER SYMPTOMS
OCCASIONAL FEELINGS OF UNSTEADINESS: 1
DEPRESSION: 1
LOSS OF SENSATION IN FEET: 1

## 2024-03-07 ASSESSMENT — PATIENT HEALTH QUESTIONNAIRE - PHQ9
4. FEELING TIRED OR HAVING LITTLE ENERGY: NEARLY EVERY DAY
SUM OF ALL RESPONSES TO PHQ9 QUESTIONS 1 AND 2: 5
1. LITTLE INTEREST OR PLEASURE IN DOING THINGS: NEARLY EVERY DAY
9. THOUGHTS THAT YOU WOULD BE BETTER OFF DEAD, OR OF HURTING YOURSELF: SEVERAL DAYS
2. FEELING DOWN, DEPRESSED OR HOPELESS: MORE THAN HALF THE DAYS
5. POOR APPETITE OR OVEREATING: NEARLY EVERY DAY
SUM OF ALL RESPONSES TO PHQ QUESTIONS 1-9: 21
10. IF YOU CHECKED OFF ANY PROBLEMS, HOW DIFFICULT HAVE THESE PROBLEMS MADE IT FOR YOU TO DO YOUR WORK, TAKE CARE OF THINGS AT HOME, OR GET ALONG WITH OTHER PEOPLE: SOMEWHAT DIFFICULT
3. TROUBLE FALLING OR STAYING ASLEEP OR SLEEPING TOO MUCH: NEARLY EVERY DAY
7. TROUBLE CONCENTRATING ON THINGS, SUCH AS READING THE NEWSPAPER OR WATCHING TELEVISION: NEARLY EVERY DAY
6. FEELING BAD ABOUT YOURSELF - OR THAT YOU ARE A FAILURE OR HAVE LET YOURSELF OR YOUR FAMILY DOWN: NEARLY EVERY DAY
8. MOVING OR SPEAKING SO SLOWLY THAT OTHER PEOPLE COULD HAVE NOTICED. OR THE OPPOSITE, BEING SO FIGETY OR RESTLESS THAT YOU HAVE BEEN MOVING AROUND A LOT MORE THAN USUAL: NOT AT ALL

## 2024-03-07 ASSESSMENT — ACTIVITIES OF DAILY LIVING (ADL)
DRESSING: NEEDS ASSISTANCE
GROCERY_SHOPPING: NEEDS ASSISTANCE
BATHING: NEEDS ASSISTANCE
TAKING_MEDICATION: NEEDS ASSISTANCE
DOING_HOUSEWORK: NEEDS ASSISTANCE
MANAGING_FINANCES: NEEDS ASSISTANCE

## 2024-03-07 NOTE — PROGRESS NOTES
Subjective   Reason for Visit: Dmitriy Delgado is an 89 y.o. male here for a Medicare Wellness visit.     Past Medical, Surgical, and Family History reviewed and updated in chart.         Medicare physical  AWV  Diabetes, A1c 8.0%, taking glyburide-metformin  mg and Tradjenta 5 mg daily. Does not want to take any other pills. Discussed lifestyle changes including portion control, food choices, and increased activity.  Hypertension, controlled, continue losartan 50 mg daily and chlorthalidone 25 mg daily.  Dyslipidemia, controlled, continue atorvastatin 40 mg daily.  Allergic rhinitis, recommend over-the-counter antihistamine such as Zyrtec or Claritin  Complains of multiple joint aches and pains, worse in his knees. Rarely takes Tylenol. Recommend Tylenol 650 mg 2 tabs every 12 hours as needed for pain.  Agrees to referral to PT.  Seeing ortho for injections. Helps for a while.  GERD controlled with omeprazole 20 mg daily.  PVD, neuropathy. Complains of some tingling in his feet. Denies cramping. Needs to return to podiatry for regular diabetic foot care.  History of prostate cancer, patient follows with urology  Depression, declines medication or referral for counseling.  I spent greater than 15 minutes face-to-face with individual providing recommendations for nutrition choices and exercise plan to help achieve weight reduction.  I spent 15 minutes obtaining and discussing depression screening using PHQ-2 questions with results documented in the chart. The screening indicated potential depression and PHQ-9 was obtained with treatment and referral plan discussed.    Preventive:  CRC screen:  Ophthalmology:  Podiatry:  Urine albumin: 8/2022        Patient Care Team:  KRISTINA Pearce DNP as PCP - General  KRISTINA Pearce DNP as PCP - [employer] PCP     Review of Systems   Constitutional:  Negative for activity change, chills, fatigue and unexpected weight change.  "  HENT:  Positive for hearing loss and rhinorrhea. Negative for congestion, ear pain and sore throat.    Eyes: Negative.    Respiratory:  Negative for cough, shortness of breath and wheezing.    Cardiovascular:  Negative for chest pain, palpitations and leg swelling.   Gastrointestinal: Negative.  Negative for abdominal pain, constipation, diarrhea, nausea and vomiting.   Endocrine: Negative.    Genitourinary: Negative.    Musculoskeletal:  Positive for arthralgias, back pain and gait problem.   Skin: Negative.    Allergic/Immunologic: Negative.    Neurological:  Negative for dizziness, speech difficulty, numbness and headaches.   Hematological: Negative.    Psychiatric/Behavioral: Negative.     All other systems reviewed and are negative.      Objective   Vitals:  /80   Pulse 92   Temp 36.1 °C (96.9 °F)   Ht 1.727 m (5' 8\")   Wt 93.1 kg (205 lb 4.8 oz)   SpO2 96%   BMI 31.22 kg/m²       Physical Exam  Vitals and nursing note reviewed.   Constitutional:       General: He is not in acute distress.     Appearance: Normal appearance. He is obese. He is not ill-appearing.   HENT:      Head: Normocephalic and atraumatic.      Right Ear: Tympanic membrane, ear canal and external ear normal.      Left Ear: Tympanic membrane, ear canal and external ear normal.      Nose: Nose normal.      Mouth/Throat:      Mouth: Mucous membranes are moist.      Pharynx: Oropharynx is clear.   Eyes:      Extraocular Movements: Extraocular movements intact.      Conjunctiva/sclera: Conjunctivae normal.      Pupils: Pupils are equal, round, and reactive to light.   Cardiovascular:      Rate and Rhythm: Normal rate and regular rhythm.      Pulses: Normal pulses.      Heart sounds: Normal heart sounds. No murmur heard.  Pulmonary:      Effort: Pulmonary effort is normal. No respiratory distress.      Breath sounds: Normal breath sounds. No wheezing.   Abdominal:      General: Bowel sounds are normal. There is no distension.      " Palpations: Abdomen is soft.      Tenderness: There is no abdominal tenderness.   Musculoskeletal:         General: No tenderness. Normal range of motion.      Cervical back: Normal range of motion and neck supple.      Right lower leg: No edema.      Left lower leg: No edema.      Comments: Using rollator   Skin:     General: Skin is warm and dry.      Capillary Refill: Capillary refill takes less than 2 seconds.   Neurological:      General: No focal deficit present.      Mental Status: He is alert and oriented to person, place, and time.   Psychiatric:         Mood and Affect: Mood normal.         Behavior: Behavior normal.         Thought Content: Thought content normal.         Judgment: Judgment normal.         Assessment/Plan     Dmitriy was seen today for diabetes, medicare annual wellness visit subsequent and nausea.  Diagnoses and all orders for this visit:  Routine general medical examination at health care facility (Primary)  Type 2 diabetes mellitus with other specified complication, unspecified whether long term insulin use (CMS/[facility])  -     POCT fingerstick glucose manually resulted  -     POCT glycosylated hemoglobin (Hb A1C) manually resulted  Chronic heart failure, unspecified heart failure type (CMS/[facility])  Depression, major, in remission (CMS/[facility])  Malignant tumor of prostate (CMS/[facility])  Benign essential hypertension  Mixed hyperlipidemia  Advanced directives, counseling/discussion  Depression screening  Class 1 obesity with serious comorbidity and body mass index (BMI) of 31.0 to 31.9 in adult, unspecified obesity type  Other orders  -     Follow Up In Primary Care - Medicare Annual  -     Follow Up In Primary Care - Established; Future     # Hypertension, controlled  -continue losartan 50 mg daily, chlorthalidone 25 mg daily  -Low fat/cholesterol, low sodium, low carbohydrate diet  -Exercise as tolerated 150 minutes/week, increase activity slowly  -Weight loss   # Diabetes, A1c  8.0%  -glyburide/metformin 10 mg-1000 mg 2 times daily, be sure to take twice a day  -continue Tradjenta 5 mg daily  -better glycemic control, decrease carbohydrate intake, increase activity level gradually  -Low fat/cholesterol, low sodium, carbohydrate controlled diet  -Exercise as tolerated 150 minutes/week, increase activity slowly  -Weight loss  # Hyperlipidemia  -continue atorvastatin 40 mg daily  -low fat/low cholesterol diet  -eat more fresh foods  -avoid processed/pre-packaged/fast foods  -increase physical activity  -monitor lipid panel  -weight loss  # Joint pain  -Tylenol 650 mg every 6 hours as needed for pain or ibuprofen 200 mg take one tab 2 times a day as needed for pain, gentle stretching exercises, referral to PT  -follow with ortho  -Referral to PT  # Balance problems, fall  -Referral to PT  # Diabetic foot care and onychomycosis  -follow with podiatry  # Vitamin D deficiency  -continue vitamin D 125 mcg daily  -monitor vitamin D  # Hx prostate cancer  -follow with urology  # PVD  -monitor  # Squamous cell carcinoma  -following with surgery  # Obesity, BMI 31  -Avoid sweets and sugary drinks  -Drink plenty of water  -Avoid processed foods and refined foods  -Eat fruits, vegetables, lean protein, whole grains  -Increase your activity level     Follow-up 3 months and as needed  Patient was identified as a fall risk. Risk prevention instructions provided.

## 2024-03-07 NOTE — PATIENT INSTRUCTIONS
Protein drink, lower carbs less sugar    Glucerna  Boost glucose control        Ways to Help Prevent Falls at Home    Quick Tips   ? Ask for help if you need it. Most people want to help!   ? Get up slowly after sitting or laying down   ? Wear a medical alert device or keep cell phone in your pocket   ? Use night lights, especially areas near a bathroom   ? Keep the items you use often within reach on a small stool or end table   ? Use an assistive device such as walker or cane, as directed by provider/physical therapy   ? Use a non-slip mat and grab bars in your bathroom. Look for home health sections for best options     Other Areas to Focus On   ? Exercise and nutrition: Regular exercise or taking a falls prevention class are great ways improve strength and balance. Don’t forget to stay hydrated and bring a snack!   ? Medicine side effects: Some medicines can make you sleepy or dizzy, which could cause a fall. Ask your healthcare provider about the side effects your medicines could cause. Be sure to let them know if you take any vitamins or supplements as well.   ? Tripping hazards: Remove items you could trip on, such as loose mats, rugs, cords, and clutter. Wear closed toe shoes with rubber soles.   ? Health and wellness: Get regular checkups with your healthcare provider, plus routine vision and hearing screenings. Talk with your healthcare provider about:   o Your medicines and the possible side effects - bring them in a bag if that is easier!   o Problems with balance or feeling dizzy   o Ways to promote bone health, such as Vitamin D and calcium supplements   o Questions or concerns about falling     *Ask your healthcare team if you have questions     ©[facility], 2022

## 2024-03-15 PROBLEM — I73.9 PVD (PERIPHERAL VASCULAR DISEASE) (CMS-HCC): Status: RESOLVED | Noted: 2023-04-26 | Resolved: 2024-03-15

## 2024-03-15 PROBLEM — I50.9 CHRONIC HEART FAILURE, UNSPECIFIED HEART FAILURE TYPE (MULTI): Status: ACTIVE | Noted: 2024-03-15

## 2024-03-15 ASSESSMENT — ENCOUNTER SYMPTOMS
NUMBNESS: 0
WHEEZING: 0
ENDOCRINE NEGATIVE: 1
EYES NEGATIVE: 1
HEADACHES: 0
GASTROINTESTINAL NEGATIVE: 1
DIZZINESS: 0
DIARRHEA: 0
SORE THROAT: 0
PALPITATIONS: 0
CONSTIPATION: 0
VOMITING: 0
CHILLS: 0
RHINORRHEA: 1
ACTIVITY CHANGE: 0
ALLERGIC/IMMUNOLOGIC NEGATIVE: 1
SPEECH DIFFICULTY: 0
ARTHRALGIAS: 1
NAUSEA: 0
BACK PAIN: 1
PSYCHIATRIC NEGATIVE: 1
UNEXPECTED WEIGHT CHANGE: 0
HEMATOLOGIC/LYMPHATIC NEGATIVE: 1
ABDOMINAL PAIN: 0
SHORTNESS OF BREATH: 0
FATIGUE: 0
COUGH: 0

## 2024-03-23 DIAGNOSIS — E11.9 TYPE 2 DIABETES MELLITUS WITHOUT COMPLICATION, UNSPECIFIED WHETHER LONG TERM INSULIN USE (MULTI): ICD-10-CM

## 2024-03-23 DIAGNOSIS — E87.6 HYPOKALEMIA: ICD-10-CM

## 2024-03-25 RX ORDER — ATENOLOL AND CHLORTHALIDONE TABLET 50; 25 MG/1; MG/1
1 TABLET ORAL DAILY
Qty: 90 TABLET | Refills: 0 | OUTPATIENT
Start: 2024-03-25

## 2024-03-25 RX ORDER — LINAGLIPTIN 5 MG/1
5 TABLET, FILM COATED ORAL DAILY
Qty: 90 TABLET | Refills: 0 | Status: SHIPPED | OUTPATIENT
Start: 2024-03-25

## 2024-03-25 RX ORDER — GLYBURIDE-METFORMIN HYDROCHLORIDE 5; 500 MG/1; MG/1
2 TABLET ORAL
Qty: 360 TABLET | Refills: 0 | Status: SHIPPED | OUTPATIENT
Start: 2024-03-25

## 2024-03-25 RX ORDER — POTASSIUM CHLORIDE 750 MG/1
10 TABLET, FILM COATED, EXTENDED RELEASE ORAL DAILY
Qty: 90 TABLET | Refills: 0 | Status: SHIPPED | OUTPATIENT
Start: 2024-03-25

## 2024-04-25 DIAGNOSIS — E78.5 HYPERLIPIDEMIA, UNSPECIFIED HYPERLIPIDEMIA TYPE: ICD-10-CM

## 2024-04-25 RX ORDER — ATORVASTATIN CALCIUM 40 MG/1
40 TABLET, FILM COATED ORAL DAILY
Qty: 90 TABLET | Refills: 0 | Status: SHIPPED | OUTPATIENT
Start: 2024-04-25

## 2024-06-10 ASSESSMENT — ENCOUNTER SYMPTOMS
SHORTNESS OF BREATH: 0
NAUSEA: 0
CONSTIPATION: 0
FATIGUE: 0
ACTIVITY CHANGE: 0
BACK PAIN: 1
ABDOMINAL PAIN: 0
SORE THROAT: 0
DIARRHEA: 0
HEMATOLOGIC/LYMPHATIC NEGATIVE: 1
COUGH: 0
PALPITATIONS: 0
ARTHRALGIAS: 1
WHEEZING: 0
HEADACHES: 0
GASTROINTESTINAL NEGATIVE: 1
DIZZINESS: 0
PSYCHIATRIC NEGATIVE: 1
UNEXPECTED WEIGHT CHANGE: 0
NUMBNESS: 0
ALLERGIC/IMMUNOLOGIC NEGATIVE: 1
SPEECH DIFFICULTY: 0
VOMITING: 0
RHINORRHEA: 1
CHILLS: 0
EYES NEGATIVE: 1
ENDOCRINE NEGATIVE: 1

## 2024-06-10 NOTE — PROGRESS NOTES
Subjective   Patient ID: Dmitriy Delgado is a 89 y.o. male who presents for Diabetes, Hypertension, Hallucinations (Especially after 6pm), Urinary Symptom (Loses control of his bladder at night), and Memory Loss.    Presents with significant other.  Having some memory issues.  He is having some issues with depression, irritability.  Visual hallucinations, has seen people in the fireplace.  He is aware that this is not real.  Needs MRI of the brain to assess for lesion, CVA, or other pathology.  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  I recommended therapy, he declines.  Agrees to medication, will start escitalopram 5 mg daily.  UA checked for UTI, negative  History of prostate cancer, follows with Dr. Bansal.  PSA has risen to 3.58.  Dr. Ann referred him to oncology. He will follow up with oncology in October with repeat PSA.  Diabetes, improving, A1c 7.8%, taking glyburide-metformin  mg and Tradjenta 5 mg daily. Does not want to take any other pills. Discussed lifestyle changes including portion control, food choices, and increased activity.  Hypertension, controlled, continue losartan 50 mg daily and chlorthalidone 25 mg daily.  Dyslipidemia, controlled, continue atorvastatin 40 mg daily.  Recommend low fat/low cholesterol diet, eat more fresh foods, avoid processed/prepackaged/fast foods, increase physical activity, weight loss. Recommend Mediterranean diet.  Allergic rhinitis, recommend over-the-counter antihistamine such as Zyrtec or Claritin  Complains of multiple joint aches and pains, worse in his knees. Rarely takes Tylenol. Recommend Tylenol 650 mg 2 tabs every 12 hours as needed for pain.  Seeing ortho for injections. Helps for a while.  GERD controlled with omeprazole 20 mg daily.  PVD, neuropathy. Complains of some tingling in his feet. Denies cramping. Needs to  return to podiatry for regular diabetic foot care.    Preventive:  CRC screen:  Ophthalmology:  Podiatry:  Urine albumin: 6/2024        The ASCVD Risk score (Myke DK, et al., 2019) failed to calculate for the following reasons:    The 2019 ASCVD risk score is only valid for ages 40 to 79    Office Visit on 06/11/2024   Component Date Value Ref Range Status    POC Fingerstick Blood Glucose 06/11/2024 253 (A)  70 - 100 mg/dl Final    POC HEMOGLOBIN A1c 06/11/2024 7.8 (A)  4.2 - 6.5 % Final   Lab on 06/11/2024   Component Date Value Ref Range Status    Albumin, Urine Random 06/11/2024 15.1  Not established mg/L Final    Creatinine, Urine Random 06/11/2024 76.7  20.0 - 370.0 mg/dL Final    Albumin/Creatine Ratio 06/11/2024 19.7  <30.0 ug/mg Creat Final    Color, Urine 06/11/2024 Light-Yellow  Light-Yellow, Yellow, Dark-Yellow Final    Appearance, Urine 06/11/2024 Clear  Clear Final    Specific Gravity, Urine 06/11/2024 1.010  1.005 - 1.035 Final    pH, Urine 06/11/2024 6.5  5.0, 5.5, 6.0, 6.5, 7.0, 7.5, 8.0 Final    Protein, Urine 06/11/2024 NEGATIVE  NEGATIVE, 10 (TRACE), 20 (TRACE) mg/dL Final    Glucose, Urine 06/11/2024 50 (TRACE) (A)  Normal mg/dL Final    Blood, Urine 06/11/2024 NEGATIVE  NEGATIVE Final    Ketones, Urine 06/11/2024 NEGATIVE  NEGATIVE mg/dL Final    Bilirubin, Urine 06/11/2024 NEGATIVE  NEGATIVE Final    Urobilinogen, Urine 06/11/2024 Normal  Normal mg/dL Final    Nitrite, Urine 06/11/2024 NEGATIVE  NEGATIVE Final    Leukocyte Esterase, Urine 06/11/2024 NEGATIVE  NEGATIVE Final    Extra Tube 06/11/2024 Hold for add-ons.   Final    Auto resulted.       ECG 12 Lead  sinus bradycardia, intra-atrial conduction delay       Review of Systems   Constitutional:  Negative for activity change, chills, fatigue and unexpected weight change.   HENT:  Positive for hearing loss and rhinorrhea. Negative for congestion, ear pain and sore throat.    Eyes: Negative.    Respiratory:  Negative for cough, shortness of  breath and wheezing.    Cardiovascular:  Negative for chest pain, palpitations and leg swelling.   Gastrointestinal: Negative.  Negative for abdominal pain, constipation, diarrhea, nausea and vomiting.   Endocrine: Negative.    Genitourinary: Negative.    Musculoskeletal:  Positive for arthralgias, back pain and gait problem.   Skin: Negative.    Allergic/Immunologic: Negative.    Neurological:  Negative for dizziness, speech difficulty, numbness and headaches.   Hematological: Negative.    Psychiatric/Behavioral: Negative.     All other systems reviewed and are negative.      Objective   Visit Vitals  /74   Pulse 103   Temp 36.2 °C (97.1 °F)   SpO2 97%   Smoking Status Former      Physical Exam  Vitals and nursing note reviewed.   Constitutional:       General: He is not in acute distress.     Appearance: Normal appearance. He is obese. He is not ill-appearing.   HENT:      Head: Normocephalic and atraumatic.      Right Ear: Tympanic membrane, ear canal and external ear normal.      Left Ear: Tympanic membrane, ear canal and external ear normal.      Nose: Nose normal.      Mouth/Throat:      Mouth: Mucous membranes are moist.      Pharynx: Oropharynx is clear.   Eyes:      Extraocular Movements: Extraocular movements intact.      Conjunctiva/sclera: Conjunctivae normal.      Pupils: Pupils are equal, round, and reactive to light.   Cardiovascular:      Rate and Rhythm: Normal rate and regular rhythm.      Pulses: Normal pulses.      Heart sounds: Normal heart sounds. No murmur heard.  Pulmonary:      Effort: Pulmonary effort is normal. No respiratory distress.      Breath sounds: Normal breath sounds. No wheezing.   Abdominal:      General: Bowel sounds are normal. There is no distension.      Palpations: Abdomen is soft.      Tenderness: There is no abdominal tenderness.   Musculoskeletal:         General: No tenderness. Normal range of motion.      Cervical back: Normal range of motion and neck supple.       Right lower leg: No edema.      Left lower leg: No edema.      Comments: Using cane   Skin:     General: Skin is warm and dry.      Capillary Refill: Capillary refill takes less than 2 seconds.   Neurological:      General: No focal deficit present.      Mental Status: He is alert and oriented to person, place, and time.   Psychiatric:         Mood and Affect: Mood normal.         Behavior: Behavior normal.         Thought Content: Thought content normal.         Judgment: Judgment normal.         Assessment/Plan   Dmitriy was seen today for diabetes, hypertension, hallucinations, urinary symptom and memory loss.  Diagnoses and all orders for this visit:  Type 2 diabetes mellitus with other specified complication, unspecified whether long term insulin use (Multi) (Primary)  -     POCT fingerstick glucose manually resulted  -     POCT glycosylated hemoglobin (Hb A1C) manually resulted  Benign essential hypertension  Urinary symptom or sign  -     Urinalysis with Reflex Culture and Microscopic; Future  Depression, recurrent (CMS-HCC)  -     escitalopram (Lexapro) 5 mg tablet; Take 1 tablet (5 mg) by mouth once daily.  Chronic heart failure, unspecified heart failure type (Multi)  Mixed hyperlipidemia  Malignant tumor of prostate (Multi)  Class 1 obesity with serious comorbidity and body mass index (BMI) of 31.0 to 31.9 in adult, unspecified obesity type  Memory loss  -     MR brain w and wo IV contrast; Future  Hallucinations  -     MR brain w and wo IV contrast; Future  Other orders  -     Follow Up In Primary Care - Established  -     Follow Up In Primary Care - Established; Future    # Memory loss, hallucinations  -MRI of the brain follow-up results for further recommendations  # Depression  -Start escitalopram 5 mg daily  # Hypertension, controlled  -continue losartan 50 mg daily, chlorthalidone 25 mg daily  -Low fat/cholesterol, low sodium, low carbohydrate diet  -Exercise as tolerated 150 minutes/week, increase  activity slowly  -Weight loss   # Diabetes, A1c 7.8%  -glyburide/metformin 10 mg-1000 mg 2 times daily, be sure to take twice a day  -continue Tradjenta 5 mg daily  -better glycemic control, decrease carbohydrate intake, increase activity level gradually  -Low fat/cholesterol, low sodium, carbohydrate controlled diet  -Exercise as tolerated 150 minutes/week, increase activity slowly  -Weight loss  # Hyperlipidemia  -continue atorvastatin 40 mg daily  -low fat/low cholesterol diet  -eat more fresh foods  -avoid processed/pre-packaged/fast foods  -increase physical activity  -monitor lipid panel  -weight loss  # Joint pain  -Tylenol 650 mg every 6 hours as needed for pain or ibuprofen 200 mg take one tab 2 times a day as needed for pain, gentle stretching exercises, referral to PT  -follow with ortho  -Referral to PT  # Balance problems, fall  **Fall precautions:  -Keep walkways well lit and clear of tripping hazards--remove throw rugs  -Avoid wearing loose close that can be tripped over--wear shoes to prevent slipping  -Use assistive devices such as grab bars, shower chair as recommended  -Keep your eye exam and glasses up-to-date  -Keep active and keep moving   # Diabetic foot care and onychomycosis  -follow with podiatry  # Vitamin D deficiency  -continue vitamin D 125 mcg daily  -monitor vitamin D  # Hx prostate cancer  -follow with urology and oncology  # PVD  -monitor  # Squamous cell carcinoma  -following with surgery  # Obesity, BMI 31  -Avoid sweets and sugary drinks  -Drink plenty of water  -Avoid processed foods and refined foods  -Eat fruits, vegetables, lean protein, whole grains  -Increase your activity level     Follow-up 3 months and as needed  Patient was identified as a fall risk. Risk prevention instructions provided.

## 2024-06-11 ENCOUNTER — OFFICE VISIT (OUTPATIENT)
Dept: PRIMARY CARE | Facility: CLINIC | Age: 89
End: 2024-06-11
Payer: MEDICARE

## 2024-06-11 ENCOUNTER — LAB (OUTPATIENT)
Dept: LAB | Facility: LAB | Age: 89
End: 2024-06-11
Payer: MEDICARE

## 2024-06-11 VITALS
DIASTOLIC BLOOD PRESSURE: 74 MMHG | OXYGEN SATURATION: 97 % | SYSTOLIC BLOOD PRESSURE: 118 MMHG | HEART RATE: 103 BPM | TEMPERATURE: 97.1 F

## 2024-06-11 DIAGNOSIS — R39.9 URINARY SYMPTOM OR SIGN: ICD-10-CM

## 2024-06-11 DIAGNOSIS — E11.65 TYPE 2 DIABETES MELLITUS WITH HYPERGLYCEMIA, WITHOUT LONG-TERM CURRENT USE OF INSULIN (MULTI): ICD-10-CM

## 2024-06-11 DIAGNOSIS — F33.9 DEPRESSION, RECURRENT (CMS-HCC): ICD-10-CM

## 2024-06-11 DIAGNOSIS — R41.3 MEMORY LOSS: ICD-10-CM

## 2024-06-11 DIAGNOSIS — E78.2 MIXED HYPERLIPIDEMIA: ICD-10-CM

## 2024-06-11 DIAGNOSIS — E11.69 TYPE 2 DIABETES MELLITUS WITH OTHER SPECIFIED COMPLICATION, UNSPECIFIED WHETHER LONG TERM INSULIN USE (MULTI): Primary | ICD-10-CM

## 2024-06-11 DIAGNOSIS — E66.9 CLASS 1 OBESITY WITH SERIOUS COMORBIDITY AND BODY MASS INDEX (BMI) OF 31.0 TO 31.9 IN ADULT, UNSPECIFIED OBESITY TYPE: ICD-10-CM

## 2024-06-11 DIAGNOSIS — I50.9 CHRONIC HEART FAILURE, UNSPECIFIED HEART FAILURE TYPE (MULTI): ICD-10-CM

## 2024-06-11 DIAGNOSIS — C61 MALIGNANT TUMOR OF PROSTATE (MULTI): ICD-10-CM

## 2024-06-11 DIAGNOSIS — I10 BENIGN ESSENTIAL HYPERTENSION: ICD-10-CM

## 2024-06-11 DIAGNOSIS — R44.3 HALLUCINATIONS: ICD-10-CM

## 2024-06-11 LAB
APPEARANCE UR: CLEAR
BILIRUB UR STRIP.AUTO-MCNC: NEGATIVE MG/DL
COLOR UR: ABNORMAL
CREAT UR-MCNC: 76.7 MG/DL (ref 20–370)
GLUCOSE UR STRIP.AUTO-MCNC: ABNORMAL MG/DL
HOLD SPECIMEN: NORMAL
KETONES UR STRIP.AUTO-MCNC: NEGATIVE MG/DL
LEUKOCYTE ESTERASE UR QL STRIP.AUTO: NEGATIVE
MICROALBUMIN UR-MCNC: 15.1 MG/L
MICROALBUMIN/CREAT UR: 19.7 UG/MG CREAT
NITRITE UR QL STRIP.AUTO: NEGATIVE
PH UR STRIP.AUTO: 6.5 [PH]
POC FINGERSTICK BLOOD GLUCOSE: 253 MG/DL (ref 70–100)
POC HEMOGLOBIN A1C: 7.8 % (ref 4.2–6.5)
PROT UR STRIP.AUTO-MCNC: NEGATIVE MG/DL
RBC # UR STRIP.AUTO: NEGATIVE /UL
SP GR UR STRIP.AUTO: 1.01
UROBILINOGEN UR STRIP.AUTO-MCNC: NORMAL MG/DL

## 2024-06-11 PROCEDURE — 1159F MED LIST DOCD IN RCRD: CPT | Performed by: NURSE PRACTITIONER

## 2024-06-11 PROCEDURE — 82043 UR ALBUMIN QUANTITATIVE: CPT

## 2024-06-11 PROCEDURE — 1160F RVW MEDS BY RX/DR IN RCRD: CPT | Performed by: NURSE PRACTITIONER

## 2024-06-11 PROCEDURE — 3078F DIAST BP <80 MM HG: CPT | Performed by: NURSE PRACTITIONER

## 2024-06-11 PROCEDURE — 99214 OFFICE O/P EST MOD 30 MIN: CPT | Performed by: NURSE PRACTITIONER

## 2024-06-11 PROCEDURE — 82570 ASSAY OF URINE CREATININE: CPT

## 2024-06-11 PROCEDURE — 3074F SYST BP LT 130 MM HG: CPT | Performed by: NURSE PRACTITIONER

## 2024-06-11 PROCEDURE — 83036 HEMOGLOBIN GLYCOSYLATED A1C: CPT | Performed by: NURSE PRACTITIONER

## 2024-06-11 PROCEDURE — 82962 GLUCOSE BLOOD TEST: CPT | Performed by: NURSE PRACTITIONER

## 2024-06-11 RX ORDER — ESCITALOPRAM OXALATE 5 MG/1
5 TABLET ORAL DAILY
Qty: 30 TABLET | Refills: 2 | Status: SHIPPED | OUTPATIENT
Start: 2024-06-11 | End: 2024-09-09

## 2024-06-11 NOTE — PATIENT INSTRUCTIONS

## 2024-06-28 DIAGNOSIS — E11.9 TYPE 2 DIABETES MELLITUS WITHOUT COMPLICATION, UNSPECIFIED WHETHER LONG TERM INSULIN USE (MULTI): ICD-10-CM

## 2024-06-28 RX ORDER — LINAGLIPTIN 5 MG/1
5 TABLET, FILM COATED ORAL DAILY
Qty: 90 TABLET | Refills: 0 | Status: SHIPPED | OUTPATIENT
Start: 2024-06-28

## 2024-07-10 ENCOUNTER — APPOINTMENT (OUTPATIENT)
Dept: RADIOLOGY | Facility: HOSPITAL | Age: 89
End: 2024-07-10
Payer: MEDICARE

## 2024-07-15 DIAGNOSIS — I10 BENIGN ESSENTIAL HYPERTENSION: ICD-10-CM

## 2024-07-15 RX ORDER — LOSARTAN POTASSIUM 50 MG/1
50 TABLET ORAL DAILY
Qty: 90 TABLET | Refills: 3 | Status: SHIPPED | OUTPATIENT
Start: 2024-07-15 | End: 2025-07-15

## 2024-07-22 DIAGNOSIS — E78.5 HYPERLIPIDEMIA, UNSPECIFIED HYPERLIPIDEMIA TYPE: ICD-10-CM

## 2024-07-22 DIAGNOSIS — E87.6 HYPOKALEMIA: ICD-10-CM

## 2024-07-22 RX ORDER — POTASSIUM CHLORIDE 750 MG/1
10 TABLET, FILM COATED, EXTENDED RELEASE ORAL DAILY
Qty: 90 TABLET | Refills: 1 | Status: SHIPPED | OUTPATIENT
Start: 2024-07-22

## 2024-07-22 RX ORDER — ATORVASTATIN CALCIUM 40 MG/1
40 TABLET, FILM COATED ORAL DAILY
Qty: 90 TABLET | Refills: 1 | Status: SHIPPED | OUTPATIENT
Start: 2024-07-22

## 2024-09-10 ENCOUNTER — APPOINTMENT (OUTPATIENT)
Dept: PRIMARY CARE | Facility: CLINIC | Age: 89
End: 2024-09-10
Payer: MEDICARE

## 2024-09-12 ENCOUNTER — APPOINTMENT (OUTPATIENT)
Dept: PRIMARY CARE | Facility: CLINIC | Age: 89
End: 2024-09-12
Payer: MEDICARE

## 2024-09-12 ASSESSMENT — ENCOUNTER SYMPTOMS
CONSTIPATION: 0
ACTIVITY CHANGE: 0
PSYCHIATRIC NEGATIVE: 1
UNEXPECTED WEIGHT CHANGE: 0
HEMATOLOGIC/LYMPHATIC NEGATIVE: 1
WHEEZING: 0
CHILLS: 0
SPEECH DIFFICULTY: 0
HEADACHES: 0
BACK PAIN: 1
ENDOCRINE NEGATIVE: 1
VOMITING: 0
COUGH: 0
ALLERGIC/IMMUNOLOGIC NEGATIVE: 1
FATIGUE: 0
DIZZINESS: 0
ARTHRALGIAS: 1
RHINORRHEA: 1
SORE THROAT: 0
DIARRHEA: 0
SHORTNESS OF BREATH: 0
NUMBNESS: 0
PALPITATIONS: 0
NAUSEA: 0
ABDOMINAL PAIN: 0
EYES NEGATIVE: 1
GASTROINTESTINAL NEGATIVE: 1

## 2024-09-12 NOTE — PROGRESS NOTES
Subjective   Patient ID: Dmitriy Delgado is a 89 y.o. male who presents for Diabetes, Depression (Escitalopram causing vomiting every morning after he takes it), Hypertension, Med Refill, and Immunizations (Flu vaccine given).    Presents with significant other.   Depression, anxiety, did not tolerate escitalopram, he has been taking every evening and then has nausea and vomiting every morning.  He did not take this morning, no nausea and vomiting.  Hold escitalopram.  History of prostate cancer, follows with Dr. Bansal.  PSA has risen to 3.58.  Dr. Ann referred him to oncology. He will follow up with oncology in October with repeat PSA.  Diabetes, improving, A1c 7.8%, taking glyburide-metformin  mg and Tradjenta 5 mg daily. Does not want to take any other pills. Discussed lifestyle changes including portion control, food choices, and increased activity.  Hypertension, controlled, continue losartan 50 mg daily and chlorthalidone 25 mg daily.  Dyslipidemia, controlled, continue atorvastatin 40 mg daily.  Recommend low fat/low cholesterol diet, eat more fresh foods, avoid processed/prepackaged/fast foods, increase physical activity, weight loss. Recommend Mediterranean diet.  Allergic rhinitis, recommend over-the-counter antihistamine such as Zyrtec or Claritin  Complains of multiple joint aches and pains, worse in his knees. Rarely takes Tylenol. Recommend Tylenol 650 mg 2 tabs every 12 hours as needed for pain.  Seeing ortho for injections. Helps for a while.  GERD controlled with omeprazole 20 mg daily.  PVD, neuropathy. Complains of some tingling in his feet. Denies cramping. Needs to return to podiatry for regular diabetic foot care.    Preventive:  CRC screen:  Ophthalmology:  Podiatry:  Urine albumin: 6/2024        The ASCVD Risk score (Myke DK, et al., 2019) failed to calculate for the following reasons:    The 2019 ASCVD risk score is only valid for ages 40 to 79    No visits with results  within 3 Month(s) from this visit.   Latest known visit with results is:   Office Visit on 06/11/2024   Component Date Value Ref Range Status    POC Fingerstick Blood Glucose 06/11/2024 253 (A)  70 - 100 mg/dl Final    POC HEMOGLOBIN A1c 06/11/2024 7.8 (A)  4.2 - 6.5 % Final       ECG 12 Lead  sinus bradycardia, intra-atrial conduction delay       Review of Systems   Constitutional:  Negative for activity change, chills, fatigue and unexpected weight change.   HENT:  Positive for hearing loss and rhinorrhea. Negative for congestion, ear pain and sore throat.    Eyes: Negative.    Respiratory:  Negative for cough, shortness of breath and wheezing.    Cardiovascular:  Negative for chest pain, palpitations and leg swelling.   Gastrointestinal: Negative.  Negative for abdominal pain, constipation, diarrhea, nausea and vomiting.   Endocrine: Negative.    Genitourinary: Negative.    Musculoskeletal:  Positive for arthralgias, back pain and gait problem.   Skin: Negative.    Allergic/Immunologic: Negative.    Neurological:  Negative for dizziness, speech difficulty, numbness and headaches.   Hematological: Negative.    Psychiatric/Behavioral: Negative.     All other systems reviewed and are negative.      Objective   Visit Vitals  /76   Pulse 110   Temp 35.9 °C (96.6 °F)   Wt 92.1 kg (203 lb)   SpO2 96%   BMI 30.87 kg/m²   Smoking Status Former   BSA 2.1 m²      Physical Exam  Vitals and nursing note reviewed.   Constitutional:       General: He is not in acute distress.     Appearance: Normal appearance. He is obese. He is not ill-appearing.   HENT:      Head: Normocephalic and atraumatic.      Right Ear: Tympanic membrane, ear canal and external ear normal.      Left Ear: Tympanic membrane, ear canal and external ear normal.      Nose: Nose normal.      Mouth/Throat:      Mouth: Mucous membranes are moist.      Pharynx: Oropharynx is clear.   Eyes:      Extraocular Movements: Extraocular movements intact.       Conjunctiva/sclera: Conjunctivae normal.      Pupils: Pupils are equal, round, and reactive to light.   Cardiovascular:      Rate and Rhythm: Normal rate and regular rhythm.      Pulses: Normal pulses.      Heart sounds: Normal heart sounds. No murmur heard.  Pulmonary:      Effort: Pulmonary effort is normal. No respiratory distress.      Breath sounds: Normal breath sounds. No wheezing.   Abdominal:      General: Bowel sounds are normal. There is no distension.      Palpations: Abdomen is soft.      Tenderness: There is no abdominal tenderness.   Musculoskeletal:         General: No tenderness. Normal range of motion.      Cervical back: Normal range of motion and neck supple.      Right lower leg: No edema.      Left lower leg: No edema.      Comments: Using cane   Skin:     General: Skin is warm and dry.      Capillary Refill: Capillary refill takes less than 2 seconds.   Neurological:      General: No focal deficit present.      Mental Status: He is alert and oriented to person, place, and time.   Psychiatric:         Mood and Affect: Mood normal.         Behavior: Behavior normal.         Thought Content: Thought content normal.         Judgment: Judgment normal.       Assessment/Plan   Dmitriy was seen today for diabetes, depression, hypertension, med refill and immunizations.  Diagnoses and all orders for this visit:  Vitamin D deficiency (Primary)  -     Vitamin D 25-Hydroxy,Total (for eval of Vitamin D levels); Future  Type 2 diabetes mellitus without complication, unspecified whether long term insulin use (Multi)  -     glyburide-metformin (Glucovance) 5-500 mg tablet; Take 2 tablets by mouth 2 times daily (morning and late afternoon).  -     linaGLIPtin (Tradjenta) 5 mg tablet; Take 1 tablet (5 mg) by mouth once daily.  -     Albumin-Creatinine Ratio, Urine Random; Future  -     CBC and Auto Differential; Future  -     Comprehensive Metabolic Panel; Future  -     TSH with reflex to Free T4 if abnormal;  Future  -     Vitamin B12; Future  Benign essential hypertension  -     chlorthalidone (Hygroton) 25 mg tablet; Take 1 tablet (25 mg) by mouth once daily.  Need for immunization against influenza  -     Flu vaccine, trivalent, preservative free, HIGH-DOSE, age 65y+ (Fluzone)  Mixed hyperlipidemia  -     Lipid Panel; Future     # Memory loss, hallucinations  -MRI of the brain follow-up results for further recommendations, did not complete  # Depression  -SMonitor  # Hypertension, controlled  -continue losartan 50 mg daily, chlorthalidone 25 mg daily  -Low fat/cholesterol, low sodium, low carbohydrate diet  -Exercise as tolerated 150 minutes/week, increase activity slowly  -Weight loss   # Diabetes, A1c 7.8%  -glyburide/metformin 10 mg-1000 mg 2 times daily, be sure to take twice a day  -continue Tradjenta 5 mg daily  -better glycemic control, decrease carbohydrate intake, increase activity level gradually  -Low fat/cholesterol, low sodium, carbohydrate controlled diet  -Exercise as tolerated 150 minutes/week, increase activity slowly  -Weight loss  # Hyperlipidemia  -continue atorvastatin 40 mg daily  -low fat/low cholesterol diet  -eat more fresh foods  -avoid processed/pre-packaged/fast foods  -increase physical activity  -monitor lipid panel  -weight loss  # Joint pain  -Tylenol 650 mg every 6 hours as needed for pain or ibuprofen 200 mg take one tab 2 times a day as needed for pain, gentle stretching exercises, referral to PT  -follow with ortho  -Referral to PT  # Balance problems, fall  **Fall precautions:  -Keep walkways well lit and clear of tripping hazards--remove throw rugs  -Avoid wearing loose close that can be tripped over--wear shoes to prevent slipping  -Use assistive devices such as grab bars, shower chair as recommended  -Keep your eye exam and glasses up-to-date  -Keep active and keep moving   # Diabetic foot care and onychomycosis  -follow with podiatry  # Vitamin D deficiency  -continue vitamin D  125 mcg daily  -monitor vitamin D  # Hx prostate cancer  -follow with urology and oncology  # PVD  -monitor  # Squamous cell carcinoma  -following with surgery  # Obesity, BMI 30  -Avoid sweets and sugary drinks  -Drink plenty of water  -Avoid processed foods and refined foods  -Eat fruits, vegetables, lean protein, whole grains  -Increase your activity level     Follow-up as needed with me until 10/11/2024 and follow up after with new provider  Patient advised I will be leaving [facility], my last day is October 11, 2024. Discussed options for new provider.  Patient was identified as a fall risk. Risk prevention instructions provided.

## 2024-09-16 ENCOUNTER — APPOINTMENT (OUTPATIENT)
Dept: PRIMARY CARE | Facility: CLINIC | Age: 89
End: 2024-09-16
Payer: MEDICARE

## 2024-09-16 VITALS
OXYGEN SATURATION: 96 % | BODY MASS INDEX: 30.87 KG/M2 | HEART RATE: 110 BPM | TEMPERATURE: 96.6 F | WEIGHT: 203 LBS | SYSTOLIC BLOOD PRESSURE: 112 MMHG | DIASTOLIC BLOOD PRESSURE: 76 MMHG

## 2024-09-16 DIAGNOSIS — E11.9 TYPE 2 DIABETES MELLITUS WITHOUT COMPLICATION, UNSPECIFIED WHETHER LONG TERM INSULIN USE (MULTI): ICD-10-CM

## 2024-09-16 DIAGNOSIS — E55.9 VITAMIN D DEFICIENCY: Primary | ICD-10-CM

## 2024-09-16 DIAGNOSIS — Z23 NEED FOR IMMUNIZATION AGAINST INFLUENZA: ICD-10-CM

## 2024-09-16 DIAGNOSIS — E78.2 MIXED HYPERLIPIDEMIA: ICD-10-CM

## 2024-09-16 DIAGNOSIS — I10 BENIGN ESSENTIAL HYPERTENSION: ICD-10-CM

## 2024-09-16 PROCEDURE — 3078F DIAST BP <80 MM HG: CPT | Performed by: NURSE PRACTITIONER

## 2024-09-16 PROCEDURE — G0008 ADMIN INFLUENZA VIRUS VAC: HCPCS | Performed by: NURSE PRACTITIONER

## 2024-09-16 PROCEDURE — 1159F MED LIST DOCD IN RCRD: CPT | Performed by: NURSE PRACTITIONER

## 2024-09-16 PROCEDURE — 3074F SYST BP LT 130 MM HG: CPT | Performed by: NURSE PRACTITIONER

## 2024-09-16 PROCEDURE — 1160F RVW MEDS BY RX/DR IN RCRD: CPT | Performed by: NURSE PRACTITIONER

## 2024-09-16 PROCEDURE — 1036F TOBACCO NON-USER: CPT | Performed by: NURSE PRACTITIONER

## 2024-09-16 PROCEDURE — 90662 IIV NO PRSV INCREASED AG IM: CPT | Performed by: NURSE PRACTITIONER

## 2024-09-16 PROCEDURE — 99214 OFFICE O/P EST MOD 30 MIN: CPT | Performed by: NURSE PRACTITIONER

## 2024-09-16 RX ORDER — GLYBURIDE-METFORMIN HYDROCHLORIDE 5; 500 MG/1; MG/1
2 TABLET ORAL
Qty: 360 TABLET | Refills: 1 | Status: SHIPPED | OUTPATIENT
Start: 2024-09-16

## 2024-09-16 RX ORDER — CHLORTHALIDONE 25 MG/1
25 TABLET ORAL DAILY
Qty: 90 TABLET | Refills: 1 | Status: SHIPPED | OUTPATIENT
Start: 2024-09-16

## 2024-09-16 RX ORDER — LINAGLIPTIN 5 MG/1
5 TABLET, FILM COATED ORAL DAILY
Qty: 90 TABLET | Refills: 1 | Status: SHIPPED | OUTPATIENT
Start: 2024-09-16

## 2024-09-16 NOTE — PATIENT INSTRUCTIONS

## 2024-10-30 DIAGNOSIS — E11.9 TYPE 2 DIABETES MELLITUS WITHOUT COMPLICATION, UNSPECIFIED WHETHER LONG TERM INSULIN USE (MULTI): ICD-10-CM

## 2024-10-30 RX ORDER — LINAGLIPTIN 5 MG/1
5 TABLET, FILM COATED ORAL DAILY
Qty: 30 TABLET | Refills: 1 | Status: SHIPPED | OUTPATIENT
Start: 2024-10-30

## 2025-01-08 ENCOUNTER — LAB (OUTPATIENT)
Dept: LAB | Facility: LAB | Age: OVER 89
End: 2025-01-08
Payer: MEDICARE

## 2025-01-08 ENCOUNTER — OFFICE VISIT (OUTPATIENT)
Dept: PRIMARY CARE | Facility: CLINIC | Age: OVER 89
End: 2025-01-08
Payer: MEDICARE

## 2025-01-08 VITALS
DIASTOLIC BLOOD PRESSURE: 72 MMHG | WEIGHT: 208 LBS | BODY MASS INDEX: 33.43 KG/M2 | HEIGHT: 66 IN | SYSTOLIC BLOOD PRESSURE: 132 MMHG | HEART RATE: 96 BPM

## 2025-01-08 DIAGNOSIS — R79.89 ELEVATED SERUM CREATININE: ICD-10-CM

## 2025-01-08 DIAGNOSIS — D64.9 ANEMIA, UNSPECIFIED TYPE: Primary | ICD-10-CM

## 2025-01-08 DIAGNOSIS — D64.9 ANEMIA, UNSPECIFIED TYPE: ICD-10-CM

## 2025-01-08 DIAGNOSIS — E11.9 TYPE 2 DIABETES MELLITUS WITHOUT COMPLICATION, UNSPECIFIED WHETHER LONG TERM INSULIN USE (MULTI): Primary | ICD-10-CM

## 2025-01-08 DIAGNOSIS — E87.6 HYPOKALEMIA: ICD-10-CM

## 2025-01-08 DIAGNOSIS — M25.562 CHRONIC PAIN OF LEFT KNEE: ICD-10-CM

## 2025-01-08 DIAGNOSIS — I50.9 CHRONIC HEART FAILURE, UNSPECIFIED HEART FAILURE TYPE: ICD-10-CM

## 2025-01-08 DIAGNOSIS — I10 BENIGN ESSENTIAL HYPERTENSION: ICD-10-CM

## 2025-01-08 DIAGNOSIS — G89.29 CHRONIC PAIN OF LEFT KNEE: ICD-10-CM

## 2025-01-08 LAB
ALBUMIN SERPL BCP-MCNC: 4.1 G/DL (ref 3.4–5)
ALP SERPL-CCNC: 73 U/L (ref 33–136)
ALT SERPL W P-5'-P-CCNC: 16 U/L (ref 10–52)
ANION GAP SERPL CALC-SCNC: 13 MMOL/L (ref 10–20)
AST SERPL W P-5'-P-CCNC: 19 U/L (ref 9–39)
BILIRUB SERPL-MCNC: 0.9 MG/DL (ref 0–1.2)
BUN SERPL-MCNC: 21 MG/DL (ref 6–23)
CALCIUM SERPL-MCNC: 9.8 MG/DL (ref 8.6–10.3)
CHLORIDE SERPL-SCNC: 100 MMOL/L (ref 98–107)
CO2 SERPL-SCNC: 30 MMOL/L (ref 21–32)
CREAT SERPL-MCNC: 1.32 MG/DL (ref 0.5–1.3)
EGFRCR SERPLBLD CKD-EPI 2021: 51 ML/MIN/1.73M*2
ERYTHROCYTE [DISTWIDTH] IN BLOOD BY AUTOMATED COUNT: 12.5 % (ref 11.5–14.5)
GLUCOSE SERPL-MCNC: 234 MG/DL (ref 74–99)
HCT VFR BLD AUTO: 34.2 % (ref 41–52)
HGB BLD-MCNC: 10.8 G/DL (ref 13.5–17.5)
IRON SATN MFR SERPL: 24 % (ref 25–45)
IRON SERPL-MCNC: 64 UG/DL (ref 35–150)
MCH RBC QN AUTO: 31.7 PG (ref 26–34)
MCHC RBC AUTO-ENTMCNC: 31.6 G/DL (ref 32–36)
MCV RBC AUTO: 100 FL (ref 80–100)
NRBC BLD-RTO: 0 /100 WBCS (ref 0–0)
PLATELET # BLD AUTO: 238 X10*3/UL (ref 150–450)
POC HEMOGLOBIN A1C: 7.2 % (ref 4.2–6.5)
POTASSIUM SERPL-SCNC: 3.8 MMOL/L (ref 3.5–5.3)
PROT SERPL-MCNC: 7.5 G/DL (ref 6.4–8.2)
RBC # BLD AUTO: 3.41 X10*6/UL (ref 4.5–5.9)
SODIUM SERPL-SCNC: 139 MMOL/L (ref 136–145)
TIBC SERPL-MCNC: 269 UG/DL (ref 240–445)
UIBC SERPL-MCNC: 205 UG/DL (ref 110–370)
WBC # BLD AUTO: 10.4 X10*3/UL (ref 4.4–11.3)

## 2025-01-08 PROCEDURE — 99204 OFFICE O/P NEW MOD 45 MIN: CPT | Performed by: FAMILY MEDICINE

## 2025-01-08 PROCEDURE — 3075F SYST BP GE 130 - 139MM HG: CPT | Performed by: FAMILY MEDICINE

## 2025-01-08 PROCEDURE — 99214 OFFICE O/P EST MOD 30 MIN: CPT | Performed by: FAMILY MEDICINE

## 2025-01-08 PROCEDURE — 3078F DIAST BP <80 MM HG: CPT | Performed by: FAMILY MEDICINE

## 2025-01-08 PROCEDURE — 1159F MED LIST DOCD IN RCRD: CPT | Performed by: FAMILY MEDICINE

## 2025-01-08 PROCEDURE — 1124F ACP DISCUSS-NO DSCNMKR DOCD: CPT | Performed by: FAMILY MEDICINE

## 2025-01-08 PROCEDURE — 83036 HEMOGLOBIN GLYCOSYLATED A1C: CPT | Mod: MUE | Performed by: FAMILY MEDICINE

## 2025-01-08 PROCEDURE — 1036F TOBACCO NON-USER: CPT | Performed by: FAMILY MEDICINE

## 2025-01-08 PROCEDURE — 1126F AMNT PAIN NOTED NONE PRSNT: CPT | Performed by: FAMILY MEDICINE

## 2025-01-08 ASSESSMENT — ENCOUNTER SYMPTOMS
SHORTNESS OF BREATH: 0
COUGH: 0
PALPITATIONS: 0
ABDOMINAL PAIN: 0
DIZZINESS: 0
CHEST TIGHTNESS: 0
JOINT SWELLING: 1
DIFFICULTY URINATING: 0
ACTIVITY CHANGE: 0
HEADACHES: 0

## 2025-01-08 ASSESSMENT — PAIN SCALES - GENERAL: PAINLEVEL_OUTOF10: 0-NO PAIN

## 2025-01-08 ASSESSMENT — PATIENT HEALTH QUESTIONNAIRE - PHQ9
1. LITTLE INTEREST OR PLEASURE IN DOING THINGS: NOT AT ALL
SUM OF ALL RESPONSES TO PHQ9 QUESTIONS 1 AND 2: 0
2. FEELING DOWN, DEPRESSED OR HOPELESS: NOT AT ALL

## 2025-01-08 NOTE — PATIENT INSTRUCTIONS
Doing very well.   Check the blood tests today.   You may stop the Atorvastatin since there are no studies that show it helps in people 90 years old without coronary artery disease. They can cause some muscle aches and even contribute to dementia.

## 2025-01-08 NOTE — PROGRESS NOTES
"Subjective   Patient ID: Dmitriy Delgado is a 89 y.o. male who presents for Establish Care, Hypertension, and Diabetes.    HPI   Here to establish as a patient.  Hemoglobin A1c is 7.2 today.  His NP in [place] moved on so he is coming here to me today.  He states that he has relatives were [place], which is why he picked me.  Also, he wants to be close to home, as he lives right [place] on [address].  He is here with his partner of many years.  He states he gets around pretty good and his wheelchair.  He does not drive anymore because he has some left knee pain and is afraid that he will be able to react quickly enough.  His partner takes him around radiates to go.  Denies any history of CAD though he has had some CHF mostly here in his chart.  I do not see any labs since September 2023.    Review of Systems   Constitutional:  Negative for activity change.   Respiratory:  Negative for cough, chest tightness and shortness of breath.    Cardiovascular:  Negative for chest pain, palpitations and leg swelling.   Gastrointestinal:  Negative for abdominal pain.   Genitourinary:  Negative for difficulty urinating.   Musculoskeletal:  Positive for joint swelling (Left knee does give him some pain).   Neurological:  Negative for dizziness and headaches.       Objective   Pulse 96   Ht 1.676 m (5' 6\")   Wt 94.3 kg (208 lb)   BMI 33.57 kg/m²     Physical Exam  Constitutional:       Appearance: Normal appearance.      Comments: He sits in a wheelchair.   Neck:      Thyroid: No thyromegaly or thyroid tenderness.      Vascular: No carotid bruit.   Cardiovascular:      Rate and Rhythm: Normal rate and regular rhythm.      Heart sounds: No murmur heard.  Pulmonary:      Effort: Pulmonary effort is normal.      Breath sounds: Normal breath sounds.   Musculoskeletal:      Cervical back: Neck supple.   Neurological:      Mental Status: He is alert.   Psychiatric:         Mood and Affect: Mood normal.         Assessment/Plan "   Diagnoses and all orders for this visit:  Type 2 diabetes mellitus without complication, unspecified whether long term insulin use (Multi)  -     POCT glycosylated hemoglobin (Hb A1C) manually resulted  Benign essential hypertension  -     Comprehensive Metabolic Panel; Future  -     CBC; Future  Chronic heart failure, unspecified heart failure type  Hypokalemia  Overall, doing well.  His diabetes is pretty well-controlled.  Blood pressure is good.  He may stop the atorvastatin since there are not any studies to show a great benefit in the 89-year-old man without any CAD.  I will see him back in 6 months, earlier if needed.

## 2025-02-10 DIAGNOSIS — E87.6 HYPOKALEMIA: ICD-10-CM

## 2025-02-12 RX ORDER — POTASSIUM CHLORIDE 750 MG/1
10 TABLET, FILM COATED, EXTENDED RELEASE ORAL DAILY
Qty: 90 TABLET | Refills: 1 | Status: SHIPPED | OUTPATIENT
Start: 2025-02-12

## 2025-05-09 DIAGNOSIS — I10 BENIGN ESSENTIAL HYPERTENSION: ICD-10-CM

## 2025-05-10 RX ORDER — CHLORTHALIDONE 25 MG/1
25 TABLET ORAL DAILY
Qty: 90 TABLET | Refills: 1 | Status: SHIPPED | OUTPATIENT
Start: 2025-05-10

## 2025-06-10 ENCOUNTER — TELEPHONE (OUTPATIENT)
Dept: PRIMARY CARE | Facility: CLINIC | Age: OVER 89
End: 2025-06-10
Payer: MEDICARE

## 2025-06-10 NOTE — TELEPHONE ENCOUNTER
He has a rash on his back and sides for more than a month.  It is pinkish red spots all over his back. They have used eucerin and it is not helping .  It itches but is not painful.  Please advise

## 2025-06-11 ENCOUNTER — APPOINTMENT (OUTPATIENT)
Dept: PRIMARY CARE | Facility: CLINIC | Age: OVER 89
End: 2025-06-11
Payer: MEDICARE

## 2025-06-11 DIAGNOSIS — E11.9 TYPE 2 DIABETES MELLITUS WITHOUT COMPLICATION, UNSPECIFIED WHETHER LONG TERM INSULIN USE: ICD-10-CM

## 2025-06-16 ENCOUNTER — OFFICE VISIT (OUTPATIENT)
Dept: PRIMARY CARE | Facility: CLINIC | Age: OVER 89
End: 2025-06-16
Payer: MEDICARE

## 2025-06-16 VITALS
HEART RATE: 88 BPM | SYSTOLIC BLOOD PRESSURE: 120 MMHG | WEIGHT: 194.6 LBS | DIASTOLIC BLOOD PRESSURE: 74 MMHG | BODY MASS INDEX: 31.41 KG/M2

## 2025-06-16 DIAGNOSIS — L30.9 ECZEMA, UNSPECIFIED TYPE: Primary | ICD-10-CM

## 2025-06-16 DIAGNOSIS — Z74.09 POOR MOBILITY: ICD-10-CM

## 2025-06-16 PROCEDURE — 1126F AMNT PAIN NOTED NONE PRSNT: CPT | Performed by: FAMILY MEDICINE

## 2025-06-16 PROCEDURE — 1036F TOBACCO NON-USER: CPT | Performed by: FAMILY MEDICINE

## 2025-06-16 PROCEDURE — 3078F DIAST BP <80 MM HG: CPT | Performed by: FAMILY MEDICINE

## 2025-06-16 PROCEDURE — 99214 OFFICE O/P EST MOD 30 MIN: CPT | Performed by: FAMILY MEDICINE

## 2025-06-16 PROCEDURE — 3074F SYST BP LT 130 MM HG: CPT | Performed by: FAMILY MEDICINE

## 2025-06-16 PROCEDURE — 1159F MED LIST DOCD IN RCRD: CPT | Performed by: FAMILY MEDICINE

## 2025-06-16 RX ORDER — AMMONIUM LACTATE 12 G/100G
CREAM TOPICAL 2 TIMES DAILY
Qty: 60 G | Refills: 1 | Status: SHIPPED | OUTPATIENT
Start: 2025-06-16 | End: 2025-07-16

## 2025-06-16 ASSESSMENT — PAIN SCALES - GENERAL: PAINLEVEL_OUTOF10: 0-NO PAIN

## 2025-06-16 ASSESSMENT — PATIENT HEALTH QUESTIONNAIRE - PHQ9
2. FEELING DOWN, DEPRESSED OR HOPELESS: NOT AT ALL
1. LITTLE INTEREST OR PLEASURE IN DOING THINGS: NOT AT ALL
SUM OF ALL RESPONSES TO PHQ9 QUESTIONS 1 AND 2: 0

## 2025-06-16 NOTE — PROGRESS NOTES
Subjective   Patient ID: Dmitriy Delgado is a 89 y.o. male who presents for Rash on back.    HPI   He presents today with itching on his back, especially the left side.  This underwent at least a month.  Wonders if there is something he can do for.  He is using Eucerin cream which helps somewhat.    Also, Barney is with him and is wondering if they might get some help in the home.  Is getting harder for her to shower him.  He is unsteady and she is not really strong enough to catch him if he was to fall.    Review of Systems    Objective   /74   Pulse 88   Wt 88.3 kg (194 lb 9.6 oz)   BMI 31.41 kg/m²     Physical Exam  He is alert, no apparent distress, his affect is very pleasant.  In a wheelchair.  Barney is with him.  On his back he has some red rash which appears to be eczema slightly excoriated.  She is more present on the left side than the right.  Trixat most of the left side.    Assessment/Plan   Diagnoses and all orders for this visit:  Eczema, unspecified type  -     ammonium lactate (Amlactin) 12 % cream; Apply topically 2 times a day.  Poor mobility  -     Referral to Home Care; Future  Will treat this is eczema with the Lac-Hydrin and his wife will call me next week with his response.  I also put in a referral to home health  With a medical social worker so that they can assess the situation there and see what resources are available to help Winifred with his bathing and other needs.

## 2025-06-17 ENCOUNTER — TELEPHONE (OUTPATIENT)
Dept: HOME HEALTH SERVICES | Facility: HOME HEALTH | Age: OVER 89
End: 2025-06-17
Payer: MEDICARE

## 2025-06-17 NOTE — TELEPHONE ENCOUNTER
Dr. Gillespie,  home care has received you referral for MSW. However, MSW cannot open home care cases or stand alone for home care. If this patient requires a skilled service such as PT or OT, the order can be updated with other service. If it is not needed, you can make a referral to [facility], population health, or [facility] which also provides SW and the [facility].  We will wait for your response in order to proceed or cancel this referral.  Thank you.

## 2025-06-18 ENCOUNTER — DOCUMENTATION (OUTPATIENT)
Dept: HOME HEALTH SERVICES | Facility: HOME HEALTH | Age: OVER 89
End: 2025-06-18
Payer: MEDICARE

## 2025-06-18 ENCOUNTER — TRANSCRIBE ORDERS (OUTPATIENT)
Dept: HOME HEALTH SERVICES | Facility: HOME HEALTH | Age: OVER 89
End: 2025-06-18
Payer: MEDICARE

## 2025-06-18 DIAGNOSIS — Z74.09 MOBILITY IMPAIRED: Primary | ICD-10-CM

## 2025-06-18 NOTE — TELEPHONE ENCOUNTER
Hi there!   I am not seeing a skilled need. [facility] referral has been made a non-admit     I have placed a community health worker referral for patient. They will better assist patient with appropriate resources for patient.     Thank you,   Intake department

## 2025-06-18 NOTE — HH CARE COORDINATION
Home Care received a referral for Medical Social Work. Unfortunately, we are unable to accept and process the referral at this time.    Reason:  No Skilled Disciplines Ordered    Patients, please reach out to the referring provider or your PCP to assist in obtaining an alternative home care agency and/or guidance to meet your needs.    Providers, please reach out to  Home Care at [phone] with any questions regarding the declined referral.

## 2025-06-23 ENCOUNTER — TELEPHONE (OUTPATIENT)
Dept: PRIMARY CARE | Facility: CLINIC | Age: OVER 89
End: 2025-06-23
Payer: MEDICARE

## 2025-06-23 NOTE — TELEPHONE ENCOUNTER
He is using the amlactin on his back and the friend said it burns when she puts it on.  Not sure if it is supposed to burn and also she doesn't feel like it is working.

## 2025-07-08 ENCOUNTER — OFFICE VISIT (OUTPATIENT)
Dept: PRIMARY CARE | Facility: CLINIC | Age: OVER 89
End: 2025-07-08
Payer: MEDICARE

## 2025-07-08 VITALS
BODY MASS INDEX: 31.31 KG/M2 | SYSTOLIC BLOOD PRESSURE: 94 MMHG | HEART RATE: 92 BPM | WEIGHT: 194 LBS | DIASTOLIC BLOOD PRESSURE: 58 MMHG

## 2025-07-08 DIAGNOSIS — E87.6 HYPOKALEMIA: ICD-10-CM

## 2025-07-08 DIAGNOSIS — D64.9 ANEMIA, UNSPECIFIED TYPE: ICD-10-CM

## 2025-07-08 DIAGNOSIS — N40.0 BENIGN PROSTATIC HYPERPLASIA, UNSPECIFIED WHETHER LOWER URINARY TRACT SYMPTOMS PRESENT: ICD-10-CM

## 2025-07-08 DIAGNOSIS — E11.9 TYPE 2 DIABETES MELLITUS WITHOUT COMPLICATION, UNSPECIFIED WHETHER LONG TERM INSULIN USE: Primary | ICD-10-CM

## 2025-07-08 DIAGNOSIS — L29.9 ITCHY SKIN: ICD-10-CM

## 2025-07-08 DIAGNOSIS — I10 BENIGN ESSENTIAL HYPERTENSION: ICD-10-CM

## 2025-07-08 LAB
ERYTHROCYTE [DISTWIDTH] IN BLOOD BY AUTOMATED COUNT: 13 % (ref 11–15)
HCT VFR BLD AUTO: 31.8 % (ref 38.5–50)
HGB BLD-MCNC: 10.2 G/DL (ref 13.2–17.1)
IRON SATN MFR SERPL: 27 % (CALC) (ref 20–48)
IRON SERPL-MCNC: 66 MCG/DL (ref 50–180)
MCH RBC QN AUTO: 32 PG (ref 27–33)
MCHC RBC AUTO-ENTMCNC: 32.1 G/DL (ref 32–36)
MCV RBC AUTO: 99.7 FL (ref 80–100)
PLATELET # BLD AUTO: 253 THOUSAND/UL (ref 140–400)
PMV BLD REES-ECKER: 10.4 FL (ref 7.5–12.5)
POC HEMOGLOBIN A1C: 7.2 % (ref 4.2–6.5)
RBC # BLD AUTO: 3.19 MILLION/UL (ref 4.2–5.8)
TIBC SERPL-MCNC: 244 MCG/DL (CALC) (ref 250–425)
WBC # BLD AUTO: 8.7 THOUSAND/UL (ref 3.8–10.8)

## 2025-07-08 PROCEDURE — 1036F TOBACCO NON-USER: CPT | Performed by: FAMILY MEDICINE

## 2025-07-08 PROCEDURE — 99214 OFFICE O/P EST MOD 30 MIN: CPT | Performed by: FAMILY MEDICINE

## 2025-07-08 PROCEDURE — 83036 HEMOGLOBIN GLYCOSYLATED A1C: CPT | Mod: QW | Performed by: FAMILY MEDICINE

## 2025-07-08 PROCEDURE — 1159F MED LIST DOCD IN RCRD: CPT | Performed by: FAMILY MEDICINE

## 2025-07-08 PROCEDURE — 3078F DIAST BP <80 MM HG: CPT | Performed by: FAMILY MEDICINE

## 2025-07-08 PROCEDURE — 1126F AMNT PAIN NOTED NONE PRSNT: CPT | Performed by: FAMILY MEDICINE

## 2025-07-08 PROCEDURE — 3074F SYST BP LT 130 MM HG: CPT | Performed by: FAMILY MEDICINE

## 2025-07-08 RX ORDER — GLYBURIDE-METFORMIN HYDROCHLORIDE 5; 500 MG/1; MG/1
2 TABLET ORAL
Qty: 360 TABLET | Refills: 1 | Status: SHIPPED | OUTPATIENT
Start: 2025-07-08

## 2025-07-08 ASSESSMENT — PATIENT HEALTH QUESTIONNAIRE - PHQ9
SUM OF ALL RESPONSES TO PHQ9 QUESTIONS 1 AND 2: 0
2. FEELING DOWN, DEPRESSED OR HOPELESS: NOT AT ALL
1. LITTLE INTEREST OR PLEASURE IN DOING THINGS: NOT AT ALL

## 2025-07-08 ASSESSMENT — PAIN SCALES - GENERAL: PAINLEVEL_OUTOF10: 0-NO PAIN

## 2025-07-08 NOTE — PROGRESS NOTES
Subjective   Patient ID: Dmitriy Delgado is a 89 y.o. male who presents for Diabetes and Hypertension.    HPI   He presents today for regular follow-up.  Hemoglobin A1c is good at 7.2.  His labs were good back in January except he does have that anemia which is chronic for him, lower than normal at that time.  Overall he states has been feeling pretty good.  He does have that irritation in the back.  He has been using the Lac-Hydrin but also Eucerin cream and that seems to help somewhat.  Can be very itchy at night.    His wife is here with him today and she states to be getting along okay.  He does not like to be out in the heat so he stays indoors a little more they have the air conditioning.    Review of Systems  Is not complaining the chest pain or shortness of breath.  No digestive issues at this time other than he does not digest beef well anymore so he tends to avoid that.  States he does not drink and does not smoke.  His wife feels he does not drink enough water.    Objective   BP 94/58   Pulse 92   Wt 88 kg (194 lb)   BMI 31.31 kg/m²     Physical Exam  Constitutional:       Appearance: Normal appearance.   Neck:      Thyroid: No thyromegaly or thyroid tenderness.      Vascular: No carotid bruit.   Cardiovascular:      Rate and Rhythm: Normal rate and regular rhythm.      Heart sounds: No murmur heard.  Pulmonary:      Effort: Pulmonary effort is normal.      Breath sounds: Normal breath sounds.   Musculoskeletal:      Cervical back: Neck supple.   Neurological:      Mental Status: He is alert.   Psychiatric:         Mood and Affect: Mood normal.         Assessment/Plan   Diagnoses and all orders for this visit:  Type 2 diabetes mellitus without complication, unspecified whether long term insulin use  -     POCT glycosylated hemoglobin (Hb A1C) manually resulted  Benign essential hypertension  Benign prostatic hyperplasia, unspecified whether lower urinary tract symptoms present  Hypokalemia  Overall,  doing pretty well.  We discussed that the eczema really should not be a problem here in the high humidity of the summer.  I recommended he see dermatology to see if there is any else going on.  Otherwise, continue his current medications.  I want his wife to check his blood pressures at home to see what they are running.  Is low today may be related to the heat and his low water intake.  If skin to be this low we want to stop the Cozaar, least through the summertime.  He can follow-up with me in 6 months.  Will check blood test just before that.  Also check the anemia today.

## 2025-07-08 NOTE — PATIENT INSTRUCTIONS
Follow the BP at home and let me know where it's running.   It's a little low today and if it's like this at home, we may need to decrease the medication.    The blood sugar is well controlled.     Check your blood test today.     Follow up in 6 months and do blood tests just before that.

## 2025-07-17 ENCOUNTER — RESULTS FOLLOW-UP (OUTPATIENT)
Dept: PRIMARY CARE | Facility: CLINIC | Age: OVER 89
End: 2025-07-17
Payer: MEDICARE

## 2025-07-17 DIAGNOSIS — D64.9 ANEMIA, UNSPECIFIED TYPE: ICD-10-CM

## 2025-07-17 DIAGNOSIS — E11.9 TYPE 2 DIABETES MELLITUS WITHOUT COMPLICATION, UNSPECIFIED WHETHER LONG TERM INSULIN USE: Primary | ICD-10-CM

## 2025-07-24 ENCOUNTER — TELEPHONE (OUTPATIENT)
Dept: PRIMARY CARE | Facility: CLINIC | Age: OVER 89
End: 2025-07-24
Payer: MEDICARE

## 2025-08-15 DIAGNOSIS — E87.6 HYPOKALEMIA: ICD-10-CM

## 2025-08-15 DIAGNOSIS — I10 BENIGN ESSENTIAL HYPERTENSION: ICD-10-CM

## 2025-08-15 RX ORDER — POTASSIUM CHLORIDE 750 MG/1
10 TABLET, FILM COATED, EXTENDED RELEASE ORAL DAILY
Qty: 90 TABLET | Refills: 1 | Status: SHIPPED | OUTPATIENT
Start: 2025-08-15

## 2025-08-15 RX ORDER — LOSARTAN POTASSIUM 50 MG/1
50 TABLET ORAL DAILY
Qty: 90 TABLET | Refills: 1 | Status: SHIPPED | OUTPATIENT
Start: 2025-08-15 | End: 2026-08-15